# Patient Record
Sex: MALE | Race: WHITE | NOT HISPANIC OR LATINO | Employment: UNEMPLOYED | URBAN - NONMETROPOLITAN AREA
[De-identification: names, ages, dates, MRNs, and addresses within clinical notes are randomized per-mention and may not be internally consistent; named-entity substitution may affect disease eponyms.]

---

## 2022-08-15 ENCOUNTER — TELEPHONE (OUTPATIENT)
Dept: GASTROENTEROLOGY | Facility: CLINIC | Age: 22
End: 2022-08-15

## 2022-08-15 ENCOUNTER — OFFICE VISIT (OUTPATIENT)
Dept: GASTROENTEROLOGY | Facility: CLINIC | Age: 22
End: 2022-08-15

## 2022-08-15 ENCOUNTER — HOSPITAL ENCOUNTER (OUTPATIENT)
Dept: GENERAL RADIOLOGY | Facility: HOSPITAL | Age: 22
Discharge: HOME | End: 2022-08-15
Admitting: PHYSICIAN ASSISTANT

## 2022-08-15 VITALS — WEIGHT: 115 LBS | HEIGHT: 65 IN | BODY MASS INDEX: 19.16 KG/M2

## 2022-08-15 DIAGNOSIS — K59.04 CHRONIC IDIOPATHIC CONSTIPATION: Primary | ICD-10-CM

## 2022-08-15 DIAGNOSIS — K59.04 CHRONIC IDIOPATHIC CONSTIPATION: ICD-10-CM

## 2022-08-15 PROCEDURE — 99204 OFFICE O/P NEW MOD 45 MIN: CPT | Performed by: PHYSICIAN ASSISTANT

## 2022-08-15 PROCEDURE — 74018 RADEX ABDOMEN 1 VIEW: CPT | Performed by: RADIOLOGY

## 2022-08-15 PROCEDURE — 74018 RADEX ABDOMEN 1 VIEW: CPT

## 2022-08-15 RX ORDER — POLYETHYLENE GLYCOL 3350 17 G/17G
POWDER, FOR SOLUTION ORAL
COMMUNITY
Start: 2022-07-12 | End: 2022-09-19

## 2022-08-15 RX ORDER — LACTULOSE 10 G/15ML
20 SOLUTION ORAL 2 TIMES DAILY
Qty: 946 ML | Refills: 1 | Status: SHIPPED | OUTPATIENT
Start: 2022-08-15 | End: 2022-09-19

## 2022-09-19 ENCOUNTER — OFFICE VISIT (OUTPATIENT)
Dept: GASTROENTEROLOGY | Facility: CLINIC | Age: 22
End: 2022-09-19

## 2022-09-19 ENCOUNTER — TELEPHONE (OUTPATIENT)
Dept: UROLOGY | Facility: CLINIC | Age: 22
End: 2022-09-19

## 2022-09-19 VITALS — HEIGHT: 65 IN | WEIGHT: 114.2 LBS | BODY MASS INDEX: 19.03 KG/M2

## 2022-09-19 DIAGNOSIS — K59.04 CHRONIC IDIOPATHIC CONSTIPATION: ICD-10-CM

## 2022-09-19 DIAGNOSIS — K59.04 CHRONIC IDIOPATHIC CONSTIPATION: Primary | ICD-10-CM

## 2022-09-19 PROCEDURE — 99213 OFFICE O/P EST LOW 20 MIN: CPT | Performed by: PHYSICIAN ASSISTANT

## 2022-09-19 RX ORDER — POLYETHYLENE GLYCOL 3350 17 G/17G
510 POWDER, FOR SOLUTION ORAL ONCE
Qty: 510 G | Refills: 0 | Status: SHIPPED | OUTPATIENT
Start: 2022-09-19 | End: 2022-09-19

## 2022-09-20 RX ORDER — POLYETHYLENE GLYCOL 3350 17 G/17G
510 POWDER, FOR SOLUTION ORAL TAKE AS DIRECTED
Qty: 510 G | Refills: 0 | Status: SHIPPED | OUTPATIENT
Start: 2022-09-20

## 2023-10-08 VITALS
BODY MASS INDEX: 18.99 KG/M2 | HEIGHT: 65 IN | OXYGEN SATURATION: 99 % | TEMPERATURE: 98.7 F | SYSTOLIC BLOOD PRESSURE: 164 MMHG | WEIGHT: 114 LBS | HEART RATE: 65 BPM | RESPIRATION RATE: 18 BRPM | DIASTOLIC BLOOD PRESSURE: 95 MMHG

## 2023-10-08 PROCEDURE — 99282 EMERGENCY DEPT VISIT SF MDM: CPT

## 2023-10-09 ENCOUNTER — HOSPITAL ENCOUNTER (EMERGENCY)
Facility: HOSPITAL | Age: 23
Discharge: HOME | End: 2023-10-09
Attending: EMERGENCY MEDICINE | Admitting: STUDENT IN AN ORGANIZED HEALTH CARE EDUCATION/TRAINING PROGRAM
Payer: COMMERCIAL

## 2023-10-09 DIAGNOSIS — K59.00 CONSTIPATION, UNSPECIFIED CONSTIPATION TYPE: Primary | ICD-10-CM

## 2023-10-09 LAB
BILIRUB UR QL STRIP: NEGATIVE
CLARITY UR: ABNORMAL
COLOR UR: YELLOW
GLUCOSE UR STRIP-MCNC: NEGATIVE MG/DL
HGB UR QL STRIP.AUTO: NEGATIVE
KETONES UR QL STRIP: NEGATIVE
LEUKOCYTE ESTERASE UR QL STRIP.AUTO: NEGATIVE
NITRITE UR QL STRIP: NEGATIVE
PH UR STRIP.AUTO: 6 [PH] (ref 5–8)
PROT UR QL STRIP: NEGATIVE
SP GR UR STRIP: 1.02 (ref 1–1.03)
UROBILINOGEN UR QL STRIP: ABNORMAL

## 2023-10-09 PROCEDURE — 81003 URINALYSIS AUTO W/O SCOPE: CPT | Performed by: EMERGENCY MEDICINE

## 2023-10-22 ENCOUNTER — HOSPITAL ENCOUNTER (EMERGENCY)
Facility: HOSPITAL | Age: 23
Discharge: HOME | End: 2023-10-22
Attending: EMERGENCY MEDICINE | Admitting: EMERGENCY MEDICINE
Payer: COMMERCIAL

## 2023-10-22 VITALS
DIASTOLIC BLOOD PRESSURE: 80 MMHG | SYSTOLIC BLOOD PRESSURE: 133 MMHG | WEIGHT: 114 LBS | HEART RATE: 81 BPM | OXYGEN SATURATION: 98 % | RESPIRATION RATE: 20 BRPM | HEIGHT: 64 IN | TEMPERATURE: 98.5 F | BODY MASS INDEX: 19.46 KG/M2

## 2023-10-22 DIAGNOSIS — K59.09 CHRONIC CONSTIPATION: Primary | ICD-10-CM

## 2023-10-22 PROCEDURE — 99284 EMERGENCY DEPT VISIT MOD MDM: CPT

## 2023-10-22 RX ORDER — MAG HYDROX/ALUMINUM HYD/SIMETH 400-400-40
1 SUSPENSION, ORAL (FINAL DOSE FORM) ORAL ONCE
Status: COMPLETED | OUTPATIENT
Start: 2023-10-22 | End: 2023-10-22

## 2023-10-22 RX ADMIN — GLYCERIN 1 SUPPOSITORY: 2 SUPPOSITORY RECTAL at 22:49

## 2024-12-10 ENCOUNTER — TELEPHONE (OUTPATIENT)
Dept: CARDIOLOGY | Facility: CLINIC | Age: 24
End: 2024-12-10

## 2024-12-10 NOTE — TELEPHONE ENCOUNTER
Appt scheduled.  Will continue to attempt to reach the pt to notify him of sooner appt date, time, & instructions.

## 2024-12-10 NOTE — TELEPHONE ENCOUNTER
Caller: NJJEWELPACO NOT ON [facility] BUT PT GAVE PERMISSION     Relationship: Other    Best call back number: [phone]     What is the best time to reach you: ANY     What was the call regarding: ADRIANA MARK AT [facility] WAS TO CALL UP HERE AND SEE IF PT COULD GET IN QUICKER- DUE TO HIGH BP AND CHEST PAINS. HEART MONITOR SHOWED THAT PTS HEART IS OUT OF RHYTHM. PLEASE ADVISE IF PT CAN BE SEEN SOONER, OK TO SEE ANYONE.    CHEST PAINS ACTIVE NOW, COMING AND GOING. DENIED NCC.     Is it okay if the provider responds through MyChart: CALL

## 2024-12-11 ENCOUNTER — OFFICE VISIT (OUTPATIENT)
Dept: CARDIOLOGY | Facility: CLINIC | Age: 24
End: 2024-12-11
Payer: COMMERCIAL

## 2024-12-11 VITALS
SYSTOLIC BLOOD PRESSURE: 162 MMHG | HEART RATE: 70 BPM | BODY MASS INDEX: 19.43 KG/M2 | WEIGHT: 116.6 LBS | OXYGEN SATURATION: 99 % | HEIGHT: 65 IN | DIASTOLIC BLOOD PRESSURE: 92 MMHG

## 2024-12-11 DIAGNOSIS — R06.02 SHORTNESS OF BREATH: Primary | ICD-10-CM

## 2024-12-11 DIAGNOSIS — R00.2 PALPITATIONS: ICD-10-CM

## 2024-12-11 DIAGNOSIS — R07.2 PRECORDIAL PAIN: ICD-10-CM

## 2024-12-11 DIAGNOSIS — I10 PRIMARY HYPERTENSION: ICD-10-CM

## 2024-12-11 PROCEDURE — 3080F DIAST BP >= 90 MM HG: CPT | Performed by: NURSE PRACTITIONER

## 2024-12-11 PROCEDURE — 1160F RVW MEDS BY RX/DR IN RCRD: CPT | Performed by: NURSE PRACTITIONER

## 2024-12-11 PROCEDURE — 3077F SYST BP >= 140 MM HG: CPT | Performed by: NURSE PRACTITIONER

## 2024-12-11 PROCEDURE — 1159F MED LIST DOCD IN RCRD: CPT | Performed by: NURSE PRACTITIONER

## 2024-12-11 PROCEDURE — 99204 OFFICE O/P NEW MOD 45 MIN: CPT | Performed by: NURSE PRACTITIONER

## 2024-12-11 RX ORDER — LOSARTAN POTASSIUM 25 MG/1
12.5 TABLET ORAL DAILY
Qty: 15 TABLET | Refills: 6 | Status: SHIPPED | OUTPATIENT
Start: 2024-12-11

## 2024-12-11 RX ORDER — CLONIDINE HYDROCHLORIDE 0.1 MG/1
0.1 TABLET ORAL 2 TIMES DAILY PRN
COMMUNITY

## 2024-12-11 RX ORDER — BUSPIRONE HYDROCHLORIDE 10 MG/1
1 TABLET ORAL EVERY 12 HOURS SCHEDULED
COMMUNITY
Start: 2024-12-09

## 2024-12-11 NOTE — PROGRESS NOTES
Subjective     Dagoberto Henley is a 24 y.o. male who presents to day for Hypertension and Slow Heart Rate (Monitor report in media).    CHIEF COMPLIANT  Chief Complaint   Patient presents with    Hypertension    Slow Heart Rate     Monitor report in media       Active Problems:  Problem List Items Addressed This Visit          Cardiac and Vasculature    Palpitations    Relevant Orders    Adult Transthoracic Echo Complete W/ Cont if Necessary Per Protocol    Treadmill Stress Test    Primary hypertension    Relevant Medications    cloNIDine (CATAPRES) 0.1 MG tablet    losartan (Cozaar) 25 MG tablet    Other Relevant Orders    Adult Transthoracic Echo Complete W/ Cont if Necessary Per Protocol    Treadmill Stress Test       Pulmonary and Pneumonias    Shortness of breath - Primary    Relevant Orders    Adult Transthoracic Echo Complete W/ Cont if Necessary Per Protocol    Treadmill Stress Test     Other Visit Diagnoses       Precordial pain        Relevant Orders    Treadmill Stress Test        Problem list  1.  Chest pain  2.  Shortness of breath  3.  Palpitations  4.  Hypertension    HPI  HPI  Mr. Dagoberto Henley is a 24-year-old male patient who is being seen today to establish care for cardiac evaluation.    Patient does report chest pain that occurs quite frequently.  He said he had chest pain lasting from last night into today.  He says that he did play basketball the other day and it did not really change the chest pain.  He is continue to have it.  It can occur with activity and at rest.  He says it is more coming and going now.  He overall he is unable to report the exact frequency or duration of the chest pains.  He does note that it had been worse over the last couple weeks.    Patient does report shortness of breath that mainly occurs with blood pressure issues.  He also reports worsening shortness of breath when he exercises.  He also has some dyspnea at rest and orthopnea.    Patient does have intermittent  palpitations that occur in his chest.  Unable to quantify the frequency and duration of the palpitations.  He says it will go from racing to a slow heart rate.  Patient was noted to have a sinus arrhythmia on his Holter monitor.  He was bradycardic 11.31% of the time with a minimal heart rate of 44 average heart rate is 64 and a maximum heart rate of 100.  He had no PACs and a minimal PVC burden.  We did discuss this.    He also reports that he had blood work done by his PCP which is unavailable to us at this time.  He said he was told everything was okay except for his CO2 or his vitamin D.    Patient does have chronic arterial hypertension in which she is previously on what sounds to be hydrochlorothiazide.  He said it dropped his blood pressure too low.  He is now just taking clonidine when his blood pressure gets over 160/90.  He took a clonidine this morning.  On recheck prior to discharge from the office his blood pressure dropped to 131/82 heart rate is 79.  PRIOR MEDS  Current Outpatient Medications on File Prior to Visit   Medication Sig Dispense Refill    busPIRone (BUSPAR) 10 MG tablet Take 1 tablet by mouth Every 12 (Twelve) Hours.      cloNIDine (CATAPRES) 0.1 MG tablet Take 1 tablet by mouth 2 (Two) Times a Day As Needed for High Blood Pressure (if blood pressure is greater than 160/90).      polyethylene glycol (MIRALAX) 17 GM/SCOOP powder Take 510 g by mouth Take As Directed. Place 15 cap fulls of powder in 32 oz of liquid of choice at 4:00 and 10:00  g 0     No current facility-administered medications on file prior to visit.       ALLERGIES  Amoxicillin and Penicillins    HISTORY  Past Medical History:   Diagnosis Date    Anxiety and depression     Hypertension     Stomach disease        Social History     Socioeconomic History    Marital status: Unknown   Tobacco Use    Smoking status: Never    Smokeless tobacco: Never   Vaping Use    Vaping status: Never Used   Substance and Sexual Activity  "   Alcohol use: Never    Drug use: Never    Sexual activity: Defer       Family History   Problem Relation Age of Onset    Kidney failure Mother     COPD Mother     Heart failure Mother     No Known Problems Other        Review of Systems   Constitutional:  Negative for chills, fatigue and fever.   HENT:  Positive for sore throat. Negative for congestion and rhinorrhea.    Eyes:  Negative for visual disturbance (wears glasses).   Respiratory:  Positive for shortness of breath (with blood pressure issues). Negative for apnea and chest tightness.    Cardiovascular:  Positive for palpitations (races at times other times it is slow). Negative for chest pain and leg swelling.   Gastrointestinal:  Negative for constipation, diarrhea and nausea.   Musculoskeletal:  Positive for arthralgias (right wrist). Negative for back pain and neck pain.   Skin:  Negative for rash and wound.   Allergic/Immunologic: Positive for environmental allergies. Negative for food allergies.   Neurological:  Positive for dizziness (random), weakness (in am when he gets up) and light-headedness. Negative for syncope.   Hematological:  Bruises/bleeds easily (bruise).   Psychiatric/Behavioral:  Negative for sleep disturbance.        Objective     VITALS: /92 (BP Location: Left arm, Patient Position: Sitting, Cuff Size: Adult)   Pulse 70   Ht 165.1 cm (65\")   Wt 52.9 kg (116 lb 9.6 oz)   SpO2 99%   BMI 19.40 kg/m²     LABS:   Lab Results (most recent)       None            IMAGING:   No Images in the past 120 days found..    EXAM:  Physical Exam  Vitals and nursing note reviewed.   Constitutional:       Appearance: He is well-developed.   HENT:      Head: Normocephalic.   Neck:      Thyroid: No thyroid mass.      Vascular: No carotid bruit or JVD.      Trachea: Trachea and phonation normal.   Cardiovascular:      Rate and Rhythm: Normal rate. Rhythm irregular.      Pulses:           Radial pulses are 2+ on the right side and 2+ on the left " side.        Posterior tibial pulses are 2+ on the right side and 2+ on the left side.      Heart sounds: Normal heart sounds. No murmur heard.     No friction rub. No gallop.   Pulmonary:      Effort: Pulmonary effort is normal. No respiratory distress.      Breath sounds: Normal breath sounds. No wheezing or rales.   Musculoskeletal:         General: No swelling. Normal range of motion.      Cervical back: Neck supple.   Skin:     General: Skin is warm and dry.      Capillary Refill: Capillary refill takes less than 2 seconds.      Findings: No rash.   Neurological:      Mental Status: He is alert and oriented to person, place, and time.   Psychiatric:         Speech: Speech normal.         Behavior: Behavior normal.         Thought Content: Thought content normal.         Judgment: Judgment normal.         Procedure   Procedures       Assessment & Plan    Diagnosis Plan   1. Shortness of breath  Adult Transthoracic Echo Complete W/ Cont if Necessary Per Protocol    Treadmill Stress Test      2. Palpitations  Adult Transthoracic Echo Complete W/ Cont if Necessary Per Protocol    Treadmill Stress Test      3. Primary hypertension  Adult Transthoracic Echo Complete W/ Cont if Necessary Per Protocol    Treadmill Stress Test    losartan (Cozaar) 25 MG tablet      4. Precordial pain  Treadmill Stress Test      1.  Due to patient's shortness of breath, chest pain, and palpitations I would like for him to go under an ischemic evaluation including treadmill stress test and echocardiogram.    2.  Patient continues to have palpitations with sinus arrhythmia noted on his monitor minimal PAC PVC burden.  No significant arrhythmias were noted.  Will continue to follow.    3.  Had a long conversation with patient regards to his blood pressure.  Told him that by controlling his blood pressure daily medications would be much more effective than treatment once as high with the clonidine.  He is in agreements to trial losartan 12.5  mg daily.  He will monitor his blood pressure closely and report any significant highs or lows.  Today's blood pressure was elevated.    4.  Informed of signs and symptoms of ACS and advised to seek emergent treatment for any new worsening symptoms.  Patient also advised sooner follow-up as needed.  Also advised to follow-up with family doctor as needed  This note is dictated utilizing voice recognition software.  Although this record has been proof read, transcriptional errors may still be present. If questions occur regarding the content of this record please do not hesitate to call our office.  I have reviewed and confirmed the accuracy of the ROS as documented by the MA/LPN/RN SENTHIL Kirby    Return if symptoms worsen or fail to improve, for Next scheduled follow up.    Diagnoses and all orders for this visit:    1. Shortness of breath (Primary)  -     Adult Transthoracic Echo Complete W/ Cont if Necessary Per Protocol; Future  -     Treadmill Stress Test; Future    2. Palpitations  -     Adult Transthoracic Echo Complete W/ Cont if Necessary Per Protocol; Future  -     Treadmill Stress Test; Future    3. Primary hypertension  -     Adult Transthoracic Echo Complete W/ Cont if Necessary Per Protocol; Future  -     Treadmill Stress Test; Future  -     losartan (Cozaar) 25 MG tablet; Take 0.5 tablets by mouth Daily.  Dispense: 15 tablet; Refill: 6    4. Precordial pain  -     Treadmill Stress Test; Future        Dagoberto Henley  reports that he has never smoked. He has never used smokeless tobacco. I have educated him on the risk of diseases from using tobacco products. Patient does not smoke.        BMI is within normal parameters. No other follow-up for BMI required.           MEDS ORDERED DURING VISIT:  New Medications Ordered This Visit   Medications    losartan (Cozaar) 25 MG tablet     Sig: Take 0.5 tablets by mouth Daily.     Dispense:  15 tablet     Refill:  6           This document has been  electronically signed by Kingston James Jr., APRN  December 11, 2024 11:44 EST

## 2025-01-09 ENCOUNTER — OFFICE VISIT (OUTPATIENT)
Dept: GASTROENTEROLOGY | Facility: CLINIC | Age: 25
End: 2025-01-09
Payer: COMMERCIAL

## 2025-01-09 VITALS
WEIGHT: 115.4 LBS | HEIGHT: 65 IN | BODY MASS INDEX: 19.22 KG/M2 | SYSTOLIC BLOOD PRESSURE: 145 MMHG | DIASTOLIC BLOOD PRESSURE: 80 MMHG | HEART RATE: 55 BPM

## 2025-01-09 DIAGNOSIS — K58.1 IRRITABLE BOWEL SYNDROME WITH CONSTIPATION: Primary | ICD-10-CM

## 2025-01-09 NOTE — PROGRESS NOTES
"Date of Consultation:  1/9/2025  Referring Physician: PAPO Stanton*    Chief Complaint  Constipation    Dagoberto Henley is a 24 y.o. male who presents today to [facility] for Constipation.    HPI:   24-year-old male who presents today for evaluation of constipation.  Patient states that on average he has 2-3 bowel movements per week that he rates as BSS 1, 3, or 7.  States that he takes MiraLAX \"whenever I start cramping.\"  He notes abdominal cramping before and after having a bowel movement.  States that this is slightly relieved with complete evacuation of his colon.  He is concerned that he has diarrhea that is much worse if he drinks Gatorade or plays basketball.  States that while he is playing basketball he often has to leave in order to have a bowel movement.  He denies unintentional weight loss, nausea, vomiting, abdominal pain, melena, or hematochezia.           Previous History:   Past Medical History:   Diagnosis Date    Anxiety and depression     Hypertension     Stomach disease       Past Surgical History:   Procedure Laterality Date    MOUTH SURGERY      OTHER SURGICAL HISTORY      had dental procedure      Social History     Socioeconomic History    Marital status: Unknown   Tobacco Use    Smoking status: Never    Smokeless tobacco: Never   Vaping Use    Vaping status: Never Used   Substance and Sexual Activity    Alcohol use: Never    Drug use: Never    Sexual activity: Defer        Current Medications:  Current Outpatient Medications   Medication Sig Dispense Refill    busPIRone (BUSPAR) 10 MG tablet Take 1 tablet by mouth Every 12 (Twelve) Hours.      cloNIDine (CATAPRES) 0.1 MG tablet Take 1 tablet by mouth 2 (Two) Times a Day As Needed for High Blood Pressure (if blood pressure is greater than 160/90).      losartan (Cozaar) 25 MG tablet Take 0.5 tablets by mouth Daily. 15 tablet 6    linaclotide (Linzess) 72 MCG capsule capsule Take 1 capsule " "by mouth Every Morning Before Breakfast. Wait 30 mins before eating. 60 capsule 2     No current facility-administered medications for this visit.       Allergies:   Allergies   Allergen Reactions    Amoxicillin Hives    Penicillins Hives       Vitals:   /80   Pulse 55   Ht 165.1 cm (65\")   Wt 52.3 kg (115 lb 6.4 oz)   BMI 19.20 kg/m²   Estimated body mass index is 19.2 kg/m² as calculated from the following:    Height as of this encounter: 165.1 cm (65\").    Weight as of this encounter: 52.3 kg (115 lb 6.4 oz).    Dagoberto Henley  reports that he has never smoked. He has never used smokeless tobacco. I have educated him on the risk of diseases from using tobacco products such as cancer, COPD, and heart disease.     ROS:   Review of Systems   Constitutional: Negative.    HENT: Negative.     Respiratory: Negative.     Cardiovascular: Negative.    Gastrointestinal:  Positive for abdominal pain, constipation and diarrhea. Negative for abdominal distention, anal bleeding, blood in stool, nausea, rectal pain and vomiting.   Musculoskeletal: Negative.    All other systems reviewed and are negative.       Physical Exam:   Physical Exam  Vitals reviewed.   Constitutional:       General: He is not in acute distress.     Appearance: Normal appearance. He is well-groomed. He is not toxic-appearing.   HENT:      Head: Normocephalic and atraumatic.      Mouth/Throat:      Mouth: Mucous membranes are moist.   Eyes:      Extraocular Movements: Extraocular movements intact.   Cardiovascular:      Rate and Rhythm: Normal rate and regular rhythm.      Heart sounds: Normal heart sounds. No murmur heard.  Pulmonary:      Effort: Pulmonary effort is normal. No respiratory distress.      Breath sounds: Normal breath sounds. No stridor. No wheezing or rales.   Abdominal:      General: Bowel sounds are normal. There is no distension.      Palpations: Abdomen is soft. There is no mass.      Tenderness: There is no abdominal " "tenderness. There is no guarding or rebound.      Hernia: No hernia is present.   Skin:     General: Skin is warm.      Coloration: Skin is not jaundiced.      Findings: No rash.   Neurological:      Mental Status: He is alert and oriented to person, place, and time.   Psychiatric:         Mood and Affect: Mood and affect normal.         Speech: Speech normal.         Behavior: Behavior normal. Behavior is cooperative.         Thought Content: Thought content normal.          Lab Results:   No results found for: \"WBC\", \"HGB\", \"HCT\", \"MCV\", \"RDW\", \"PLT\", \"NEUTRORELPCT\", \"LYMPHORELPCT\", \"MONORELPCT\", \"EOSRELPCT\", \"BASORELPCT\", \"NEUTROABS\", \"LYMPHSABS\"    No results found for: \"NA\", \"K\", \"CO2\", \"CL\", \"BUN\", \"CREATININE\", \"EGFRIFNONA\", \"EGFRIFAFRI\", \"GLUCOSE\", \"CALCIUM\", \"ALKPHOS\", \"AST\", \"ALT\", \"BILITOT\", \"ALBUMIN\", \"PROTEINTOT\", \"MG\", \"PHOS\"    Pathology:        Endoscopy:        Imaging:   No Images in the past 120 days found..        Results review: During today's encounter, all relevant clinical data has been reviewed.      Assessment and Plan    1. Irritable bowel syndrome with constipation (Primary)  We will trial Linzess 72 mcg once daily.  Samples provided in office.  Rx sent.  -     linaclotide (Linzess) 72 MCG capsule capsule; Take 1 capsule by mouth Every Morning Before Breakfast. Wait 30 mins before eating.  Dispense: 60 capsule; Refill: 2      New Medications:   New Medications Ordered This Visit   Medications    linaclotide (Linzess) 72 MCG capsule capsule     Sig: Take 1 capsule by mouth Every Morning Before Breakfast. Wait 30 mins before eating.     Dispense:  60 capsule     Refill:  2       Discontinued Medications:   Medications Discontinued During This Encounter   Medication Reason    polyethylene glycol (MIRALAX) 17 GM/SCOOP powder *Therapy completed        Visit Diagnoses:    ICD-10-CM ICD-9-CM   1. Irritable bowel syndrome with constipation  K58.1 564.1            Follow Up:   Return in about 8 " weeks (around 3/6/2025).    The patient was in agreement with the plan and all questions were answered to patient's satisfaction.        This document has been electronically signed by Janell Bales PA-C   January 9, 2025 15:57 EST    Dictated Utilizing Dragon Dictation: Part of this note may be an electronic transcription/translation of spoken language to printed text using the Dragon Dictation System.    CC:  PAPO Stanton*  Jaylin Mcmahon, APRN

## 2025-01-12 ENCOUNTER — APPOINTMENT (OUTPATIENT)
Dept: GENERAL RADIOLOGY | Facility: HOSPITAL | Age: 25
End: 2025-01-12
Payer: COMMERCIAL

## 2025-01-12 ENCOUNTER — HOSPITAL ENCOUNTER (EMERGENCY)
Facility: HOSPITAL | Age: 25
Discharge: HOME | End: 2025-01-12
Attending: EMERGENCY MEDICINE
Payer: COMMERCIAL

## 2025-01-12 VITALS
OXYGEN SATURATION: 96 % | HEART RATE: 64 BPM | RESPIRATION RATE: 14 BRPM | WEIGHT: 115 LBS | HEIGHT: 65 IN | SYSTOLIC BLOOD PRESSURE: 150 MMHG | TEMPERATURE: 98.2 F | BODY MASS INDEX: 19.16 KG/M2 | DIASTOLIC BLOOD PRESSURE: 89 MMHG

## 2025-01-12 DIAGNOSIS — R07.9 NONSPECIFIC CHEST PAIN: Primary | ICD-10-CM

## 2025-01-12 LAB
ALBUMIN SERPL-MCNC: 4.7 G/DL (ref 3.5–5.2)
ALBUMIN/GLOB SERPL: 1.6 G/DL
ALP SERPL-CCNC: 83 U/L (ref 39–117)
ALT SERPL W P-5'-P-CCNC: 12 U/L (ref 1–41)
ANION GAP SERPL CALCULATED.3IONS-SCNC: 11.3 MMOL/L (ref 5–15)
AST SERPL-CCNC: 19 U/L (ref 1–40)
BASOPHILS # BLD AUTO: 0.09 10*3/MM3 (ref 0–0.2)
BASOPHILS NFR BLD AUTO: 1.1 % (ref 0–1.5)
BILIRUB SERPL-MCNC: 0.8 MG/DL (ref 0–1.2)
BUN SERPL-MCNC: 13 MG/DL (ref 6–20)
BUN/CREAT SERPL: 12.6 (ref 7–25)
CALCIUM SPEC-SCNC: 9.9 MG/DL (ref 8.6–10.5)
CHLORIDE SERPL-SCNC: 104 MMOL/L (ref 98–107)
CO2 SERPL-SCNC: 25.7 MMOL/L (ref 22–29)
CREAT SERPL-MCNC: 1.03 MG/DL (ref 0.76–1.27)
DEPRECATED RDW RBC AUTO: 39.1 FL (ref 37–54)
EGFRCR SERPLBLD CKD-EPI 2021: 105.3 ML/MIN/1.73
EOSINOPHIL # BLD AUTO: 0.15 10*3/MM3 (ref 0–0.4)
EOSINOPHIL NFR BLD AUTO: 1.8 % (ref 0.3–6.2)
ERYTHROCYTE [DISTWIDTH] IN BLOOD BY AUTOMATED COUNT: 12.5 % (ref 12.3–15.4)
GEN 5 1HR TROPONIN T REFLEX: <6 NG/L
GLOBULIN UR ELPH-MCNC: 2.9 GM/DL
GLUCOSE SERPL-MCNC: 97 MG/DL (ref 65–99)
HCT VFR BLD AUTO: 46.1 % (ref 37.5–51)
HGB BLD-MCNC: 15.6 G/DL (ref 13–17.7)
HOLD SPECIMEN: NORMAL
HOLD SPECIMEN: NORMAL
IMM GRANULOCYTES # BLD AUTO: 0.03 10*3/MM3 (ref 0–0.05)
IMM GRANULOCYTES NFR BLD AUTO: 0.4 % (ref 0–0.5)
LYMPHOCYTES # BLD AUTO: 2.08 10*3/MM3 (ref 0.7–3.1)
LYMPHOCYTES NFR BLD AUTO: 24.6 % (ref 19.6–45.3)
MCH RBC QN AUTO: 29.2 PG (ref 26.6–33)
MCHC RBC AUTO-ENTMCNC: 33.8 G/DL (ref 31.5–35.7)
MCV RBC AUTO: 86.2 FL (ref 79–97)
MONOCYTES # BLD AUTO: 0.53 10*3/MM3 (ref 0.1–0.9)
MONOCYTES NFR BLD AUTO: 6.3 % (ref 5–12)
NEUTROPHILS NFR BLD AUTO: 5.59 10*3/MM3 (ref 1.7–7)
NEUTROPHILS NFR BLD AUTO: 65.8 % (ref 42.7–76)
NRBC BLD AUTO-RTO: 0 /100 WBC (ref 0–0.2)
PLATELET # BLD AUTO: 275 10*3/MM3 (ref 140–450)
PMV BLD AUTO: 9.7 FL (ref 6–12)
POTASSIUM SERPL-SCNC: 3.7 MMOL/L (ref 3.5–5.2)
PROT SERPL-MCNC: 7.6 G/DL (ref 6–8.5)
RBC # BLD AUTO: 5.35 10*6/MM3 (ref 4.14–5.8)
SODIUM SERPL-SCNC: 141 MMOL/L (ref 136–145)
TROPONIN T NUMERIC DELTA: NORMAL
TROPONIN T SERPL HS-MCNC: <6 NG/L
WBC NRBC COR # BLD AUTO: 8.47 10*3/MM3 (ref 3.4–10.8)
WHOLE BLOOD HOLD COAG: NORMAL
WHOLE BLOOD HOLD SPECIMEN: NORMAL

## 2025-01-12 PROCEDURE — 80053 COMPREHEN METABOLIC PANEL: CPT | Performed by: EMERGENCY MEDICINE

## 2025-01-12 PROCEDURE — 93010 ELECTROCARDIOGRAM REPORT: CPT | Performed by: STUDENT IN AN ORGANIZED HEALTH CARE EDUCATION/TRAINING PROGRAM

## 2025-01-12 PROCEDURE — 85025 COMPLETE CBC W/AUTO DIFF WBC: CPT | Performed by: EMERGENCY MEDICINE

## 2025-01-12 PROCEDURE — 71045 X-RAY EXAM CHEST 1 VIEW: CPT

## 2025-01-12 PROCEDURE — 93005 ELECTROCARDIOGRAM TRACING: CPT | Performed by: EMERGENCY MEDICINE

## 2025-01-12 PROCEDURE — 99284 EMERGENCY DEPT VISIT MOD MDM: CPT

## 2025-01-12 PROCEDURE — 71045 X-RAY EXAM CHEST 1 VIEW: CPT | Performed by: RADIOLOGY

## 2025-01-12 PROCEDURE — 36415 COLL VENOUS BLD VENIPUNCTURE: CPT

## 2025-01-12 PROCEDURE — 84484 ASSAY OF TROPONIN QUANT: CPT | Performed by: EMERGENCY MEDICINE

## 2025-01-12 RX ORDER — ASPIRIN 81 MG/1
324 TABLET, CHEWABLE ORAL ONCE
Status: COMPLETED | OUTPATIENT
Start: 2025-01-12 | End: 2025-01-12

## 2025-01-12 RX ORDER — SODIUM CHLORIDE 0.9 % (FLUSH) 0.9 %
10 SYRINGE (ML) INJECTION AS NEEDED
Status: DISCONTINUED | OUTPATIENT
Start: 2025-01-12 | End: 2025-01-12 | Stop reason: HOSPADM

## 2025-01-12 RX ADMIN — ASPIRIN 324 MG: 81 TABLET, CHEWABLE ORAL at 20:46

## 2025-01-13 NOTE — ED PROVIDER NOTES
Subjective   History of Present Illness  24-year-old male complains of chest pain on and off in the sternal area.  No radiation of the pain.  Apparently has had this pain before.  He has a cardiologist that he sees    No nausea vomiting no sweating.        Review of Systems   Constitutional:  Negative for activity change.   HENT:  Negative for dental problem and sneezing.    Eyes:  Negative for discharge and redness.   Respiratory:  Negative for chest tightness and shortness of breath.    Cardiovascular:  Positive for chest pain. Negative for palpitations.   Gastrointestinal:  Negative for abdominal distention and abdominal pain.   Genitourinary:  Negative for dysuria.   Musculoskeletal:  Negative for arthralgias.   Skin:  Negative for color change.   Neurological:  Negative for facial asymmetry.   Psychiatric/Behavioral:  Negative for agitation.        Past Medical History:   Diagnosis Date    Anxiety and depression     Hypertension     Stomach disease        Allergies   Allergen Reactions    Amoxicillin Hives    Penicillins Hives       Past Surgical History:   Procedure Laterality Date    MOUTH SURGERY      OTHER SURGICAL HISTORY      had dental procedure       Family History   Problem Relation Age of Onset    Kidney failure Mother     COPD Mother     Heart failure Mother     No Known Problems Other        Social History     Socioeconomic History    Marital status: Unknown   Tobacco Use    Smoking status: Never    Smokeless tobacco: Never   Vaping Use    Vaping status: Never Used   Substance and Sexual Activity    Alcohol use: Never    Drug use: Never    Sexual activity: Defer           Objective   Physical Exam  HENT:      Head: Normocephalic.      Right Ear: External ear normal.      Left Ear: External ear normal.      Nose: Nose normal.      Mouth/Throat:      Mouth: Mucous membranes are moist.   Eyes:      Pupils: Pupils are equal, round, and reactive to light.   Cardiovascular:      Rate and Rhythm: Normal  rate and regular rhythm.      Heart sounds: Normal heart sounds.   Pulmonary:      Effort: Pulmonary effort is normal.      Breath sounds: Normal breath sounds.   Abdominal:      General: Abdomen is flat.      Palpations: Abdomen is soft.   Musculoskeletal:         General: Normal range of motion.      Cervical back: Normal range of motion.   Skin:     General: Skin is warm.      Capillary Refill: Capillary refill takes less than 2 seconds.   Neurological:      General: No focal deficit present.      Mental Status: He is alert.         Procedures           ED Course  ED Course as of 01/12/25 2055   Sun Jan 12, 2025 2024 Lab work reviewed.  Chemistry and CBC is normal first troponin is negative.  EKG no acute disease.  Chest x-ray is clear. [GP]      ED Course User Index  [GP] Rodney Dominguez MD                                                       Medical Decision Making  24-year-old male with history of chest pain comes in with chest pain tonight.  His workup is negative.    Will get him home to follow-up with his cardiologist.    Amount and/or Complexity of Data Reviewed  Labs: ordered.  Radiology: ordered.  ECG/medicine tests: ordered.    Risk  OTC drugs.  Prescription drug management.        Final diagnoses:   None       ED Disposition  ED Disposition       None            No follow-up provider specified.       Medication List      No changes were made to your prescriptions during this visit.

## 2025-01-14 LAB
QT INTERVAL: 336 MS
QTC INTERVAL: 394 MS

## 2025-01-23 ENCOUNTER — TELEPHONE (OUTPATIENT)
Dept: CARDIOLOGY | Facility: CLINIC | Age: 25
End: 2025-01-23
Payer: COMMERCIAL

## 2025-01-23 NOTE — TELEPHONE ENCOUNTER
Caller: CHARBEL YU    Relationship: Mother    Best call back number: [phone]    What is the best time to reach you: ANY    Who are you requesting to speak with (clinical staff, provider,  specific staff member): CLINICAL       What was the call regarding: PATIENT WAS CALLED NO NOTE IN CHART PLEASE ADVISE.     Is it okay if the provider responds through MyChart: NO

## 2025-02-07 ENCOUNTER — APPOINTMENT (OUTPATIENT)
Dept: CT IMAGING | Facility: HOSPITAL | Age: 25
End: 2025-02-07
Payer: COMMERCIAL

## 2025-02-07 ENCOUNTER — HOSPITAL ENCOUNTER (EMERGENCY)
Facility: HOSPITAL | Age: 25
Discharge: HOME | End: 2025-02-07
Attending: STUDENT IN AN ORGANIZED HEALTH CARE EDUCATION/TRAINING PROGRAM
Payer: COMMERCIAL

## 2025-02-07 VITALS
RESPIRATION RATE: 16 BRPM | WEIGHT: 117 LBS | DIASTOLIC BLOOD PRESSURE: 85 MMHG | HEIGHT: 65 IN | OXYGEN SATURATION: 98 % | BODY MASS INDEX: 19.49 KG/M2 | TEMPERATURE: 98.2 F | HEART RATE: 66 BPM | SYSTOLIC BLOOD PRESSURE: 135 MMHG

## 2025-02-07 DIAGNOSIS — K59.00 CONSTIPATION, UNSPECIFIED CONSTIPATION TYPE: Primary | ICD-10-CM

## 2025-02-07 LAB
ALBUMIN SERPL-MCNC: 4.6 G/DL (ref 3.5–5.2)
ALBUMIN/GLOB SERPL: 1.4 G/DL
ALP SERPL-CCNC: 77 U/L (ref 39–117)
ALT SERPL W P-5'-P-CCNC: 10 U/L (ref 1–41)
ANION GAP SERPL CALCULATED.3IONS-SCNC: 10.4 MMOL/L (ref 5–15)
AST SERPL-CCNC: 17 U/L (ref 1–40)
BASOPHILS # BLD AUTO: 0.06 10*3/MM3 (ref 0–0.2)
BASOPHILS NFR BLD AUTO: 0.8 % (ref 0–1.5)
BILIRUB SERPL-MCNC: 0.7 MG/DL (ref 0–1.2)
BILIRUB UR QL STRIP: NEGATIVE
BUN SERPL-MCNC: 12 MG/DL (ref 6–20)
BUN/CREAT SERPL: 12.6 (ref 7–25)
CALCIUM SPEC-SCNC: 9.5 MG/DL (ref 8.6–10.5)
CHLORIDE SERPL-SCNC: 105 MMOL/L (ref 98–107)
CLARITY UR: CLEAR
CO2 SERPL-SCNC: 27.6 MMOL/L (ref 22–29)
COLOR UR: YELLOW
CREAT SERPL-MCNC: 0.95 MG/DL (ref 0.76–1.27)
CRP SERPL-MCNC: <0.3 MG/DL (ref 0–0.5)
DEPRECATED RDW RBC AUTO: 39 FL (ref 37–54)
EGFRCR SERPLBLD CKD-EPI 2021: 116.1 ML/MIN/1.73
EOSINOPHIL # BLD AUTO: 0.25 10*3/MM3 (ref 0–0.4)
EOSINOPHIL NFR BLD AUTO: 3.4 % (ref 0.3–6.2)
ERYTHROCYTE [DISTWIDTH] IN BLOOD BY AUTOMATED COUNT: 12.4 % (ref 12.3–15.4)
GLOBULIN UR ELPH-MCNC: 3.2 GM/DL
GLUCOSE SERPL-MCNC: 104 MG/DL (ref 65–99)
GLUCOSE UR STRIP-MCNC: NEGATIVE MG/DL
HCT VFR BLD AUTO: 45.5 % (ref 37.5–51)
HGB BLD-MCNC: 15.4 G/DL (ref 13–17.7)
HGB UR QL STRIP.AUTO: NEGATIVE
HOLD SPECIMEN: NORMAL
HOLD SPECIMEN: NORMAL
IMM GRANULOCYTES # BLD AUTO: 0.01 10*3/MM3 (ref 0–0.05)
IMM GRANULOCYTES NFR BLD AUTO: 0.1 % (ref 0–0.5)
KETONES UR QL STRIP: NEGATIVE
LEUKOCYTE ESTERASE UR QL STRIP.AUTO: NEGATIVE
LYMPHOCYTES # BLD AUTO: 1.68 10*3/MM3 (ref 0.7–3.1)
LYMPHOCYTES NFR BLD AUTO: 22.5 % (ref 19.6–45.3)
MCH RBC QN AUTO: 29.2 PG (ref 26.6–33)
MCHC RBC AUTO-ENTMCNC: 33.8 G/DL (ref 31.5–35.7)
MCV RBC AUTO: 86.3 FL (ref 79–97)
MONOCYTES # BLD AUTO: 0.49 10*3/MM3 (ref 0.1–0.9)
MONOCYTES NFR BLD AUTO: 6.6 % (ref 5–12)
NEUTROPHILS NFR BLD AUTO: 4.97 10*3/MM3 (ref 1.7–7)
NEUTROPHILS NFR BLD AUTO: 66.6 % (ref 42.7–76)
NITRITE UR QL STRIP: NEGATIVE
NRBC BLD AUTO-RTO: 0 /100 WBC (ref 0–0.2)
PH UR STRIP.AUTO: 6 [PH] (ref 5–8)
PLATELET # BLD AUTO: 233 10*3/MM3 (ref 140–450)
PMV BLD AUTO: 9.6 FL (ref 6–12)
POTASSIUM SERPL-SCNC: 3.9 MMOL/L (ref 3.5–5.2)
PROT SERPL-MCNC: 7.8 G/DL (ref 6–8.5)
PROT UR QL STRIP: NEGATIVE
RBC # BLD AUTO: 5.27 10*6/MM3 (ref 4.14–5.8)
SODIUM SERPL-SCNC: 143 MMOL/L (ref 136–145)
SP GR UR STRIP: 1.02 (ref 1–1.03)
UROBILINOGEN UR QL STRIP: ABNORMAL
WBC NRBC COR # BLD AUTO: 7.46 10*3/MM3 (ref 3.4–10.8)
WHOLE BLOOD HOLD COAG: NORMAL
WHOLE BLOOD HOLD SPECIMEN: NORMAL

## 2025-02-07 PROCEDURE — 74176 CT ABD & PELVIS W/O CONTRAST: CPT | Performed by: RADIOLOGY

## 2025-02-07 PROCEDURE — 81003 URINALYSIS AUTO W/O SCOPE: CPT

## 2025-02-07 PROCEDURE — 86140 C-REACTIVE PROTEIN: CPT

## 2025-02-07 PROCEDURE — 99284 EMERGENCY DEPT VISIT MOD MDM: CPT

## 2025-02-07 PROCEDURE — 36415 COLL VENOUS BLD VENIPUNCTURE: CPT

## 2025-02-07 PROCEDURE — 74176 CT ABD & PELVIS W/O CONTRAST: CPT

## 2025-02-07 PROCEDURE — 80053 COMPREHEN METABOLIC PANEL: CPT

## 2025-02-07 PROCEDURE — 85025 COMPLETE CBC W/AUTO DIFF WBC: CPT

## 2025-02-07 RX ORDER — MAGNESIUM CARB/ALUMINUM HYDROX 105-160MG
296 TABLET,CHEWABLE ORAL ONCE
Status: COMPLETED | OUTPATIENT
Start: 2025-02-07 | End: 2025-02-07

## 2025-02-07 RX ADMIN — CITROMA MAGNESIUM CITRATE 296 ML: 1.75 LIQUID ORAL at 21:31

## 2025-02-08 NOTE — ED NOTES
Pt had small amount of stool in the bedside commode. Per provider order to try enema again, pt refused to let RN do another soap suds enema. He stated that he wants to go home and eat and that he can take the miralax and mag citrate. Provider made aware at this time ER tech at bedside to witness

## 2025-02-08 NOTE — ED NOTES
Pt states he is having some relief at this time and is having a bowel movement. Pt is currently on the bedside commode and tolerated the soap suds enema well. Provider made aware

## 2025-02-11 NOTE — ED PROVIDER NOTES
Subjective     History provided by:  Patient  Abdominal Pain  Pain location:  Generalized  Pain quality: aching    Pain radiates to:  Does not radiate  Pain severity:  Moderate  Chronicity:  New  Context: not alcohol use, not awakening from sleep, not diet changes, not eating, not laxative use, not medication withdrawal, not previous surgeries, not recent illness, not recent sexual activity, not recent travel, not retching, not sick contacts, not suspicious food intake and not trauma    Relieved by:  None tried  Worsened by:  Nothing  Ineffective treatments:  None tried  Associated symptoms: constipation and nausea    Associated symptoms: no anorexia, no belching, no chest pain, no chills, no cough, no diarrhea, no dysuria, no fatigue, no fever, no flatus, no hematemesis, no hematochezia, no hematuria, no melena, no shortness of breath, no sore throat, no vaginal bleeding, no vaginal discharge and no vomiting        Review of Systems   Constitutional: Negative.  Negative for chills, fatigue and fever.   HENT: Negative.  Negative for sore throat.    Respiratory: Negative.  Negative for cough and shortness of breath.    Cardiovascular: Negative.  Negative for chest pain.   Gastrointestinal:  Positive for abdominal pain, constipation and nausea. Negative for anorexia, diarrhea, flatus, hematemesis, hematochezia, melena and vomiting.   Endocrine: Negative.    Genitourinary: Negative.  Negative for dysuria, hematuria, vaginal bleeding and vaginal discharge.   Skin: Negative.    Neurological: Negative.    Psychiatric/Behavioral: Negative.     All other systems reviewed and are negative.      Past Medical History:   Diagnosis Date    Anxiety and depression     Hypertension     Stomach disease        Allergies   Allergen Reactions    Amoxicillin Hives    Benadryl [Diphenhydramine] Hives    Penicillins Hives       Past Surgical History:   Procedure Laterality Date    MOUTH SURGERY      OTHER SURGICAL HISTORY      had dental  procedure       Family History   Problem Relation Age of Onset    Kidney failure Mother     COPD Mother     Heart failure Mother     No Known Problems Other        Social History     Socioeconomic History    Marital status: Unknown   Tobacco Use    Smoking status: Never    Smokeless tobacco: Never   Vaping Use    Vaping status: Never Used   Substance and Sexual Activity    Alcohol use: Never    Drug use: Never    Sexual activity: Defer           Objective   Physical Exam  Vitals and nursing note reviewed.   Constitutional:       General: He is not in acute distress.     Appearance: He is well-developed. He is not diaphoretic.   HENT:      Head: Normocephalic and atraumatic.      Right Ear: External ear normal.      Left Ear: External ear normal.      Nose: Nose normal.   Eyes:      Conjunctiva/sclera: Conjunctivae normal.      Pupils: Pupils are equal, round, and reactive to light.   Neck:      Vascular: No JVD.      Trachea: No tracheal deviation.   Cardiovascular:      Rate and Rhythm: Normal rate and regular rhythm.      Heart sounds: Normal heart sounds. No murmur heard.  Pulmonary:      Effort: Pulmonary effort is normal. No respiratory distress.      Breath sounds: Normal breath sounds. No wheezing.   Abdominal:      General: Bowel sounds are normal.      Palpations: Abdomen is soft.      Tenderness: There is no abdominal tenderness.   Musculoskeletal:         General: No deformity. Normal range of motion.      Cervical back: Normal range of motion and neck supple.   Skin:     General: Skin is warm and dry.      Coloration: Skin is not pale.      Findings: No erythema or rash.   Neurological:      Mental Status: He is alert and oriented to person, place, and time.      Cranial Nerves: No cranial nerve deficit.   Psychiatric:         Behavior: Behavior normal.         Thought Content: Thought content normal.         Procedures       Results for orders placed or performed during the hospital encounter of  02/07/25   Comprehensive Metabolic Panel    Collection Time: 02/07/25  3:54 PM    Specimen: Blood   Result Value Ref Range    Glucose 104 (H) 65 - 99 mg/dL    BUN 12 6 - 20 mg/dL    Creatinine 0.95 0.76 - 1.27 mg/dL    Sodium 143 136 - 145 mmol/L    Potassium 3.9 3.5 - 5.2 mmol/L    Chloride 105 98 - 107 mmol/L    CO2 27.6 22.0 - 29.0 mmol/L    Calcium 9.5 8.6 - 10.5 mg/dL    Total Protein 7.8 6.0 - 8.5 g/dL    Albumin 4.6 3.5 - 5.2 g/dL    ALT (SGPT) 10 1 - 41 U/L    AST (SGOT) 17 1 - 40 U/L    Alkaline Phosphatase 77 39 - 117 U/L    Total Bilirubin 0.7 0.0 - 1.2 mg/dL    Globulin 3.2 gm/dL    A/G Ratio 1.4 g/dL    BUN/Creatinine Ratio 12.6 7.0 - 25.0    Anion Gap 10.4 5.0 - 15.0 mmol/L    eGFR 116.1 >60.0 mL/min/1.73   C-reactive Protein    Collection Time: 02/07/25  3:54 PM    Specimen: Blood   Result Value Ref Range    C-Reactive Protein <0.30 0.00 - 0.50 mg/dL   CBC Auto Differential    Collection Time: 02/07/25  3:54 PM    Specimen: Blood   Result Value Ref Range    WBC 7.46 3.40 - 10.80 10*3/mm3    RBC 5.27 4.14 - 5.80 10*6/mm3    Hemoglobin 15.4 13.0 - 17.7 g/dL    Hematocrit 45.5 37.5 - 51.0 %    MCV 86.3 79.0 - 97.0 fL    MCH 29.2 26.6 - 33.0 pg    MCHC 33.8 31.5 - 35.7 g/dL    RDW 12.4 12.3 - 15.4 %    RDW-SD 39.0 37.0 - 54.0 fl    MPV 9.6 6.0 - 12.0 fL    Platelets 233 140 - 450 10*3/mm3    Neutrophil % 66.6 42.7 - 76.0 %    Lymphocyte % 22.5 19.6 - 45.3 %    Monocyte % 6.6 5.0 - 12.0 %    Eosinophil % 3.4 0.3 - 6.2 %    Basophil % 0.8 0.0 - 1.5 %    Immature Grans % 0.1 0.0 - 0.5 %    Neutrophils, Absolute 4.97 1.70 - 7.00 10*3/mm3    Lymphocytes, Absolute 1.68 0.70 - 3.10 10*3/mm3    Monocytes, Absolute 0.49 0.10 - 0.90 10*3/mm3    Eosinophils, Absolute 0.25 0.00 - 0.40 10*3/mm3    Basophils, Absolute 0.06 0.00 - 0.20 10*3/mm3    Immature Grans, Absolute 0.01 0.00 - 0.05 10*3/mm3    nRBC 0.0 0.0 - 0.2 /100 WBC   Green Top (Gel)    Collection Time: 02/07/25  3:54 PM   Result Value Ref Range    Extra  Tube Hold for add-ons.    Lavender Top    Collection Time: 02/07/25  3:54 PM   Result Value Ref Range    Extra Tube hold for add-on    Gold Top - SST    Collection Time: 02/07/25  3:54 PM   Result Value Ref Range    Extra Tube Hold for add-ons.    Light Blue Top    Collection Time: 02/07/25  3:54 PM   Result Value Ref Range    Extra Tube Hold for add-ons.    Urinalysis With Culture If Indicated - Urine, Clean Catch    Collection Time: 02/07/25  4:02 PM    Specimen: Urine, Clean Catch   Result Value Ref Range    Color, UA Yellow Yellow, Straw    Appearance, UA Clear Clear    pH, UA 6.0 5.0 - 8.0    Specific Gravity, UA 1.023 1.005 - 1.030    Glucose, UA Negative Negative    Ketones, UA Negative Negative    Bilirubin, UA Negative Negative    Blood, UA Negative Negative    Protein, UA Negative Negative    Leuk Esterase, UA Negative Negative    Nitrite, UA Negative Negative    Urobilinogen, UA 2.0 E.U./dL (A) 0.2 - 1.0 E.U./dL      CT Abdomen Pelvis Stone Protocol   Final Result   Significant rectal fecal impaction. Rectal wall thickening.       This report was finalized on 2/7/2025 6:13 PM by Alex Pallas, DO.               ED Course                                                       Medical Decision Making  Problems Addressed:  Constipation, unspecified constipation type: complicated acute illness or injury    Amount and/or Complexity of Data Reviewed  Labs: ordered.  Radiology: ordered.    Risk  OTC drugs.        Final diagnoses:   Constipation, unspecified constipation type       ED Disposition  ED Disposition       ED Disposition   Discharge    Condition   Stable    Comment   --               Jaylin Mcmahon, APRN  [address] [facility] [address]  [phone]    Call in 2 days           Medication List      No changes were made to your prescriptions during this visit.            Geri Lee, SENTHIL  02/11/25 0618

## 2025-02-13 ENCOUNTER — TELEPHONE (OUTPATIENT)
Dept: CARDIOLOGY | Facility: CLINIC | Age: 25
End: 2025-02-13
Payer: COMMERCIAL

## 2025-02-13 NOTE — TELEPHONE ENCOUNTER
I called patient and let him know that JR reviewed blood pressure log and does not wish to make any changes in medication at this time.

## 2025-02-18 ENCOUNTER — HOSPITAL ENCOUNTER (OUTPATIENT)
Dept: CARDIOLOGY | Facility: HOSPITAL | Age: 25
Discharge: HOME | End: 2025-02-18
Payer: COMMERCIAL

## 2025-02-18 DIAGNOSIS — R07.2 PRECORDIAL PAIN: ICD-10-CM

## 2025-02-18 DIAGNOSIS — R00.2 PALPITATIONS: ICD-10-CM

## 2025-02-18 DIAGNOSIS — I10 PRIMARY HYPERTENSION: ICD-10-CM

## 2025-02-18 DIAGNOSIS — R06.02 SHORTNESS OF BREATH: ICD-10-CM

## 2025-02-18 LAB
BH CV STRESS DURATION MIN STAGE 1: 3
BH CV STRESS DURATION SEC STAGE 1: 0
BH CV STRESS GRADE STAGE 1: 10
BH CV STRESS METS STAGE 1: 5
BH CV STRESS PROTOCOL 1: NORMAL
BH CV STRESS RECOVERY BP: NORMAL MMHG
BH CV STRESS RECOVERY HR: 97 BPM
BH CV STRESS SPEED STAGE 1: 1.7
BH CV STRESS STAGE 1: 1
MAXIMAL PREDICTED HEART RATE: 198 BPM
PERCENT MAX PREDICTED HR: 78.28 %
STRESS BASELINE BP: NORMAL MMHG
STRESS BASELINE HR: 71 BPM
STRESS PERCENT HR: 92 %
STRESS POST ESTIMATED WORKLOAD: 10.1 METS
STRESS POST EXERCISE DUR MIN: 8 MIN
STRESS POST EXERCISE DUR SEC: 59 SEC
STRESS POST PEAK BP: NORMAL MMHG
STRESS POST PEAK HR: 155 BPM
STRESS TARGET HR: 168 BPM

## 2025-02-18 PROCEDURE — 93018 CV STRESS TEST I&R ONLY: CPT | Performed by: INTERNAL MEDICINE

## 2025-02-18 PROCEDURE — 93306 TTE W/DOPPLER COMPLETE: CPT

## 2025-02-18 PROCEDURE — 93017 CV STRESS TEST TRACING ONLY: CPT

## 2025-02-19 LAB
AV MEAN PRESS GRAD SYS DOP V1V2: 3.2 MMHG
AV VMAX SYS DOP: 122.2 CM/SEC
BH CV ECHO MEAS - ACS: 2.18 CM
BH CV ECHO MEAS - AO MAX PG: 6 MMHG
BH CV ECHO MEAS - AO ROOT DIAM: 2.9 CM
BH CV ECHO MEAS - AO V2 VTI: 25.6 CM
BH CV ECHO MEAS - EDV(CUBED): 76.8 ML
BH CV ECHO MEAS - EDV(MOD-SP4): 79.5 ML
BH CV ECHO MEAS - EF(MOD-SP4): 68.1 %
BH CV ECHO MEAS - ESV(CUBED): 18.6 ML
BH CV ECHO MEAS - ESV(MOD-SP4): 25.4 ML
BH CV ECHO MEAS - FS: 37.6 %
BH CV ECHO MEAS - IVS/LVPW: 1.07 CM
BH CV ECHO MEAS - IVSD: 1.09 CM
BH CV ECHO MEAS - LA DIMENSION: 3 CM
BH CV ECHO MEAS - LAT PEAK E' VEL: 21.4 CM/SEC
BH CV ECHO MEAS - LV DIASTOLIC VOL/BSA (35-75): 50.5 CM2
BH CV ECHO MEAS - LV MASS(C)D: 150.8 GRAMS
BH CV ECHO MEAS - LV SYSTOLIC VOL/BSA (12-30): 16.1 CM2
BH CV ECHO MEAS - LVIDD: 4.3 CM
BH CV ECHO MEAS - LVIDS: 2.7 CM
BH CV ECHO MEAS - LVPWD: 1.02 CM
BH CV ECHO MEAS - MED PEAK E' VEL: 13.3 CM/SEC
BH CV ECHO MEAS - MV A MAX VEL: 41.6 CM/SEC
BH CV ECHO MEAS - MV DEC TIME: 0.15 SEC
BH CV ECHO MEAS - MV E MAX VEL: 84.8 CM/SEC
BH CV ECHO MEAS - MV E/A: 2.04
BH CV ECHO MEAS - RVDD: 2.7 CM
BH CV ECHO MEAS - SV(MOD-SP4): 54.1 ML
BH CV ECHO MEAS - SVI(MOD-SP4): 34.3 ML/M2
BH CV ECHO MEASUREMENTS AVERAGE E/E' RATIO: 4.89
LV EF 3D SEGMENTATION: 57 %

## 2025-02-20 ENCOUNTER — TELEPHONE (OUTPATIENT)
Dept: CARDIOLOGY | Facility: CLINIC | Age: 25
End: 2025-02-20
Payer: COMMERCIAL

## 2025-02-20 NOTE — TELEPHONE ENCOUNTER
Caller: Dagoberto Henley    Relationship: Self    Best call back number: [phone]     What is the best time to reach you: ANYTIME    Who are you requesting to speak with (clinical staff, provider,  specific staff member): PROVIDER    Do you know the name of the person who called: NA    What was the call regarding: PT'S RESULTS UPLOADED INTO HIS Web [employer], WOULD LIKE A CALL BACK RELAYING RESULTS ONCE JR HAS HAD A CHANCE TO READ AND INTERPRET OR CALL PUSHING UP APPT TO COME IN IF NECESSARY BEFORE JUNE. PLEASE ADVISE.     Is it okay if the provider responds through [employer]: NO

## 2025-02-21 ENCOUNTER — OFFICE VISIT (OUTPATIENT)
Dept: GASTROENTEROLOGY | Facility: CLINIC | Age: 25
End: 2025-02-21
Payer: COMMERCIAL

## 2025-02-21 VITALS
HEART RATE: 63 BPM | SYSTOLIC BLOOD PRESSURE: 130 MMHG | DIASTOLIC BLOOD PRESSURE: 80 MMHG | WEIGHT: 118.8 LBS | HEIGHT: 65 IN | BODY MASS INDEX: 19.79 KG/M2

## 2025-02-21 DIAGNOSIS — R13.19 ESOPHAGEAL DYSPHAGIA: ICD-10-CM

## 2025-02-21 DIAGNOSIS — R93.5 ABNORMAL CT OF THE ABDOMEN: ICD-10-CM

## 2025-02-21 DIAGNOSIS — K58.1 IRRITABLE BOWEL SYNDROME WITH CONSTIPATION: Primary | ICD-10-CM

## 2025-02-21 PROCEDURE — 1160F RVW MEDS BY RX/DR IN RCRD: CPT

## 2025-02-21 PROCEDURE — 1159F MED LIST DOCD IN RCRD: CPT

## 2025-02-21 PROCEDURE — 99214 OFFICE O/P EST MOD 30 MIN: CPT

## 2025-02-21 PROCEDURE — 3079F DIAST BP 80-89 MM HG: CPT

## 2025-02-21 PROCEDURE — 3075F SYST BP GE 130 - 139MM HG: CPT

## 2025-02-21 RX ORDER — BISACODYL 5 MG/1
20 TABLET, DELAYED RELEASE ORAL ONCE
Qty: 4 TABLET | Refills: 0 | Status: SHIPPED | OUTPATIENT
Start: 2025-02-21 | End: 2025-02-21

## 2025-02-21 RX ORDER — POLYETHYLENE GLYCOL 3350 17 G/17G
POWDER, FOR SOLUTION ORAL
Qty: 510 G | Refills: 0 | Status: SHIPPED | OUTPATIENT
Start: 2025-02-21

## 2025-02-21 RX ORDER — ALUMINUM ZIRCONIUM OCTACHLOROHYDREX GLY 16 G/100G
GEL TOPICAL 2 TIMES DAILY
Qty: 60 PACKET | Refills: 12 | Status: SHIPPED | OUTPATIENT
Start: 2025-02-21

## 2025-02-21 NOTE — PROGRESS NOTES
"Chief Complaint  Constipation    Dagoberto Henley is a 24 y.o. male who presents today to [facility] for Constipation.    HPI:   24-year-old male who presents today for evaluation of constipation.  Patient states that on average he has 2-3 bowel movements per week that he rates as BSS 1, 3, or 7.  States that he takes MiraLAX \"whenever I start cramping.\"  He notes abdominal cramping before and after having a bowel movement.  States that this is slightly relieved with complete evacuation of his colon.  He is concerned that he has diarrhea that is much worse if he drinks Gatorade or plays basketball.  States that while he is playing basketball he often has to leave in order to have a bowel movement.  He was initiated on Linzess 72 mcg.  Patient presented to the ED on 2/7/2025 with generalized abdominal pain.  CT abdomen pelvis was notable for significant rectal fecal impaction with rectal wall thickening.       Interval History:    Today, patient presents for ED follow-up.  Patient states that he had not been taking his Linzess 72 mcg.  He states that he had been prescribed this medication as a child and it did not work.  Patient did not mention this on last appointment.  He reports that his bowel movements have been daily since ED evaluation.  States that he received an enema in the ER.  Since, he is having 3-4 bowel movements per day that he rates as BSS 1.  States that he has been eating a lot of fiber and drinking a lot of water.  He takes 1 capful of MiraLAX per day.  He notes having trouble swallowing medication and solids.  States that he feels that is getting stuck in his midesophagus.  He denies unintentional weight loss, nausea, vomiting, abdominal pain, melena, and hematochezia.      Previous History:   Past Medical History:   Diagnosis Date    Anxiety and depression     Hypertension     Stomach disease       Past Surgical History:   Procedure Laterality Date    MOUTH " "SURGERY      OTHER SURGICAL HISTORY      had dental procedure      Social History     Socioeconomic History    Marital status: Unknown   Tobacco Use    Smoking status: Never    Smokeless tobacco: Never   Vaping Use    Vaping status: Never Used   Substance and Sexual Activity    Alcohol use: Never    Drug use: Never    Sexual activity: Defer        Current Medications:  Current Outpatient Medications   Medication Sig Dispense Refill    busPIRone (BUSPAR) 10 MG tablet Take 1 tablet by mouth Every 12 (Twelve) Hours.      cloNIDine (CATAPRES) 0.1 MG tablet Take 1 tablet by mouth 2 (Two) Times a Day As Needed for High Blood Pressure (if blood pressure is greater than 160/90).      losartan (Cozaar) 25 MG tablet Take 0.5 tablets by mouth Daily. 15 tablet 6    bisacodyl (DULCOLAX) 5 MG EC tablet Take 4 tablets by mouth 1 (One) Time for 1 dose. Take 4 tablets at 12:00 PM the day before procedure 4 tablet 0    polyethylene glycol (MIRALAX) 17 GM/SCOOP powder Take 255 g Miralax mixed with 32 oz clear liquid at 12pm night before procedure then repeat 6 hours later. 510 g 0    psyllium (Metamucil Smooth Texture) 58.6 % powder Take  by mouth 2 (Two) Times a Day. 60 packet 12     No current facility-administered medications for this visit.       Allergies:   Allergies   Allergen Reactions    Amoxicillin Hives    Benadryl [Diphenhydramine] Hives    Penicillins Hives       Vitals:   /80   Pulse 63   Ht 165.1 cm (65\")   Wt 53.9 kg (118 lb 12.8 oz)   BMI 19.77 kg/m²   Estimated body mass index is 19.77 kg/m² as calculated from the following:    Height as of this encounter: 165.1 cm (65\").    Weight as of this encounter: 53.9 kg (118 lb 12.8 oz).    ROS:   Review of Systems   Constitutional: Negative.    HENT: Negative.     Respiratory: Negative.     Cardiovascular: Negative.    Gastrointestinal:  Positive for constipation. Negative for abdominal distention, abdominal pain, anal bleeding, blood in stool, diarrhea, nausea, " rectal pain and vomiting.   All other systems reviewed and are negative.       Physical Exam:   Physical Exam  Vitals reviewed.   Constitutional:       General: He is not in acute distress.     Appearance: Normal appearance. He is well-groomed and normal weight. He is not toxic-appearing.   HENT:      Head: Normocephalic and atraumatic.      Nose: Nose normal.      Mouth/Throat:      Mouth: Mucous membranes are moist.   Eyes:      Extraocular Movements: Extraocular movements intact.   Cardiovascular:      Rate and Rhythm: Normal rate and regular rhythm.      Heart sounds: Normal heart sounds. No murmur heard.  Pulmonary:      Effort: Pulmonary effort is normal. No respiratory distress.      Breath sounds: Normal breath sounds. No stridor. No wheezing or rales.   Abdominal:      General: Bowel sounds are normal. There is no distension.      Palpations: Abdomen is soft. There is no mass.      Tenderness: There is no abdominal tenderness. There is no guarding or rebound.      Hernia: No hernia is present.   Skin:     General: Skin is warm.      Coloration: Skin is not jaundiced.      Findings: No rash.   Neurological:      Mental Status: He is alert and oriented to person, place, and time.   Psychiatric:         Mood and Affect: Mood and affect normal.         Speech: Speech normal.         Behavior: Behavior normal. Behavior is cooperative.         Thought Content: Thought content normal.          Lab Results:   Lab Results   Component Value Date    WBC 7.46 02/07/2025    HGB 15.4 02/07/2025    HCT 45.5 02/07/2025    MCV 86.3 02/07/2025    RDW 12.4 02/07/2025     02/07/2025    NEUTRORELPCT 66.6 02/07/2025    LYMPHORELPCT 22.5 02/07/2025    MONORELPCT 6.6 02/07/2025    EOSRELPCT 3.4 02/07/2025    BASORELPCT 0.8 02/07/2025    NEUTROABS 4.97 02/07/2025    LYMPHSABS 1.68 02/07/2025       Lab Results   Component Value Date     02/07/2025    K 3.9 02/07/2025    CO2 27.6 02/07/2025     02/07/2025    BUN 12  02/07/2025    CREATININE 0.95 02/07/2025    GLUCOSE 104 (H) 02/07/2025    CALCIUM 9.5 02/07/2025    ALKPHOS 77 02/07/2025    AST 17 02/07/2025    ALT 10 02/07/2025    BILITOT 0.7 02/07/2025    ALBUMIN 4.6 02/07/2025    PROTEINTOT 7.8 02/07/2025       Pathology:        Endoscopy:        Imaging:   CT Abdomen Pelvis Stone Protocol    Result Date: 2/7/2025  Significant rectal fecal impaction. Rectal wall thickening.  This report was finalized on 2/7/2025 6:13 PM by Alex Pallas, DO.      XR Chest 1 View    Result Date: 1/12/2025   No acute process seen in the chest. No lobar consolidation or edema. No bronchial inflammation.  This report was finalized on 1/12/2025 7:22 PM by Jose Manuel Clement MD.         Results review: During today's encounter, all relevant clinical data has been reviewed.      Assessment and Plan    1. Irritable bowel syndrome with constipation (Primary)  2. Abnormal CT of the abdomen  CT abdomen pelvis performed in February 2025 was notable for rectal wall thickening and fecal impaction of the rectum.  Largely suspect rectal wall thickening is secondary to constipation.  Patient was encouraged to proceed with medical management versus colonoscopy.  At this time, patient wishes to proceed with colonoscopy.  He was educated on risk and benefits of colonoscopy.  He verbalized understanding and was amenable to proceeding as above.  Will initiate patient on Metamucil twice daily.  Encouraged patient to start taking MiraLAX twice daily as well.  Will discontinue Linzess 72 mcg as patient reports he never picked this up.  -     psyllium (Metamucil Smooth Texture) 58.6 % powder; Take  by mouth 2 (Two) Times a Day.  Dispense: 60 packet; Refill: 12  -     Case Request; Standing  -     Follow Anesthesia Guidelines / Protocol; Future  -     Case Request  -     polyethylene glycol (MIRALAX) 17 GM/SCOOP powder; Take 255 g Miralax mixed with 32 oz clear liquid at 12pm night before procedure then repeat 6 hours later.   Dispense: 510 g; Refill: 0  -     bisacodyl (DULCOLAX) 5 MG EC tablet; Take 4 tablets by mouth 1 (One) Time for 1 dose. Take 4 tablets at 12:00 PM the day before procedure  Dispense: 4 tablet; Refill: 0    3. Esophageal dysphagia  We will proceed with esophagram followed by EGD.  Patient was educated on risk and benefits of procedure.  He verbalized understanding and was amenable to proceeding as above.  -     Case Request; Standing  -     Follow Anesthesia Guidelines / Protocol; Future  -     Case Request  -     FL ESOPHAGRAM SINGLE CONTRAST; Future      New Medications:   New Medications Ordered This Visit   Medications    psyllium (Metamucil Smooth Texture) 58.6 % powder     Sig: Take  by mouth 2 (Two) Times a Day.     Dispense:  60 packet     Refill:  12    polyethylene glycol (MIRALAX) 17 GM/SCOOP powder     Sig: Take 255 g Miralax mixed with 32 oz clear liquid at 12pm night before procedure then repeat 6 hours later.     Dispense:  510 g     Refill:  0    bisacodyl (DULCOLAX) 5 MG EC tablet     Sig: Take 4 tablets by mouth 1 (One) Time for 1 dose. Take 4 tablets at 12:00 PM the day before procedure     Dispense:  4 tablet     Refill:  0       Discontinued Medications:   Medications Discontinued During This Encounter   Medication Reason    linaclotide (Linzess) 72 MCG capsule capsule Patient Reported Not Taking        Visit Diagnoses:    ICD-10-CM ICD-9-CM   1. Irritable bowel syndrome with constipation  K58.1 564.1   2. Abnormal CT of the abdomen  R93.5 793.6   3. Esophageal dysphagia  R13.19 787.29            Follow Up:   Return in about 3 months (around 5/21/2025).    The patient was in agreement with the plan and all questions were answered to patient's satisfaction.        This document has been electronically signed by Janell Bales PA-C   February 21, 2025 16:06 EST    Dictated Utilizing Dragon Dictation: Part of this note may be an electronic transcription/translation of spoken language to printed text  using the Dragon Dictation System.    CC:  No ref. provider found  Jaylin Mcmahon APRN

## 2025-02-24 ENCOUNTER — TELEPHONE (OUTPATIENT)
Dept: CARDIOLOGY | Facility: CLINIC | Age: 25
End: 2025-02-24
Payer: COMMERCIAL

## 2025-02-24 NOTE — TELEPHONE ENCOUNTER
----- Message from Steff MCNEIL sent at 2/21/2025  1:22 PM EST -----  Regarding: FW:    ----- Message -----  From: Maria A Figueroa MA  Sent: 2/20/2025  11:43 AM EST  To: NAOMI Sánchez  Subject: FW:                                                ----- Message -----  From: Kingston James APRN  Sent: 2/19/2025  10:11 PM EST  To: Maria A Figueroa MA  Subject: FW:                                              There is no acute findings on the echocardiogram.  Keep follow-up.  ----- Message -----  From: Jose Manuel Arroyo MD  Sent: 2/19/2025   7:47 PM EST  To: SENTHIL Kirby

## 2025-02-26 ENCOUNTER — TELEPHONE (OUTPATIENT)
Dept: CARDIOLOGY | Facility: CLINIC | Age: 25
End: 2025-02-26
Payer: COMMERCIAL

## 2025-02-26 NOTE — TELEPHONE ENCOUNTER
Received cardiac clearance request from DR BRANDT stating pt has EGD AND COLONOSCOPY scheduled for 04/23/2025 and is requiring a cardiac clearance. Placed cardiac clearance request in ROB'S inbox to review and address with provider.

## 2025-03-03 ENCOUNTER — TELEPHONE (OUTPATIENT)
Dept: CARDIOLOGY | Facility: CLINIC | Age: 25
End: 2025-03-03
Payer: COMMERCIAL

## 2025-03-03 NOTE — TELEPHONE ENCOUNTER
I called patient and let him know that JR would like for him to have a sooner apt to go over stress test results. I did send  message to  to get him scheduled.

## 2025-03-03 NOTE — TELEPHONE ENCOUNTER
Caller: Dagoberto Henley    Relationship: Self    Best call back number: [phone]    What is the best time to reach you: ANY      What was the call regarding: PATIENT ASKING ABOUT SOONER APPT. ADVISED THERE IS 48 HOUR CALL BACK TIME.     Is it okay if the provider responds through MyChart: NO

## 2025-03-04 ENCOUNTER — TELEPHONE (OUTPATIENT)
Dept: CARDIOLOGY | Facility: CLINIC | Age: 25
End: 2025-03-04
Payer: COMMERCIAL

## 2025-03-04 ENCOUNTER — OFFICE VISIT (OUTPATIENT)
Dept: CARDIOLOGY | Facility: CLINIC | Age: 25
End: 2025-03-04
Payer: COMMERCIAL

## 2025-03-04 VITALS
HEART RATE: 61 BPM | DIASTOLIC BLOOD PRESSURE: 84 MMHG | WEIGHT: 114 LBS | OXYGEN SATURATION: 99 % | SYSTOLIC BLOOD PRESSURE: 129 MMHG | BODY MASS INDEX: 18.99 KG/M2 | HEIGHT: 65 IN

## 2025-03-04 DIAGNOSIS — R00.2 PALPITATIONS: ICD-10-CM

## 2025-03-04 DIAGNOSIS — R06.02 SHORTNESS OF BREATH: ICD-10-CM

## 2025-03-04 DIAGNOSIS — R94.39 ABNORMAL STRESS ECG WITH TREADMILL: ICD-10-CM

## 2025-03-04 DIAGNOSIS — R07.2 PRECORDIAL PAIN: Primary | ICD-10-CM

## 2025-03-04 DIAGNOSIS — I10 PRIMARY HYPERTENSION: ICD-10-CM

## 2025-03-04 PROCEDURE — 99214 OFFICE O/P EST MOD 30 MIN: CPT | Performed by: CLINICAL NURSE SPECIALIST

## 2025-03-04 PROCEDURE — 3079F DIAST BP 80-89 MM HG: CPT | Performed by: CLINICAL NURSE SPECIALIST

## 2025-03-04 PROCEDURE — 3074F SYST BP LT 130 MM HG: CPT | Performed by: CLINICAL NURSE SPECIALIST

## 2025-03-04 RX ORDER — SODIUM CHLORIDE 9 MG/ML
40 INJECTION, SOLUTION INTRAVENOUS AS NEEDED
OUTPATIENT
Start: 2025-03-04

## 2025-03-04 RX ORDER — SODIUM CHLORIDE 0.9 % (FLUSH) 0.9 %
10 SYRINGE (ML) INJECTION AS NEEDED
OUTPATIENT
Start: 2025-03-04

## 2025-03-04 RX ORDER — METOPROLOL TARTRATE 50 MG
50 TABLET ORAL ONCE
Qty: 1 TABLET | Refills: 0 | Status: SHIPPED | OUTPATIENT
Start: 2025-03-04 | End: 2025-03-04

## 2025-03-04 RX ORDER — NITROGLYCERIN 0.4 MG/1
0.4 TABLET SUBLINGUAL
OUTPATIENT
Start: 2025-03-04 | End: 2025-03-04

## 2025-03-04 RX ORDER — SODIUM CHLORIDE 0.9 % (FLUSH) 0.9 %
10 SYRINGE (ML) INJECTION EVERY 12 HOURS SCHEDULED
OUTPATIENT
Start: 2025-03-04

## 2025-03-04 NOTE — PROGRESS NOTES
Subjective     Dagoberto Henley is a 24 y.o. male who presents today for Chest Pain (Abn stress test and echo follow up), Palpitations, and Shortness of Breath.    CHIEF COMPLIANT  Chief Complaint   Patient presents with    Chest Pain     Abn stress test and echo follow up    Palpitations    Shortness of Breath       Active Problems:  Problem list  1.  Chest pain  1.1 Treadmill stress 2/18/25: At peak exercise there is 1 mm for subtle to downsloping ST segment depression in lead III with a lesser degree of depression in the other inferior leads as well as lateral precordial leads. ST segment depression was no longer significant at 1 minute into recovery. There was a single PVC during exercise. There were moderately frequent PVCs in recovery including intercalated beats. There was no sustained ectopy or block recorded.   2.  Shortness of breath  2.1 Echocardiogram 2/18/25: ejection fraction is 55 to 60%. Normal LV diastolic function and filling pressures. Valves are morphologically and physiologically normal.   3.  Palpitations  4.  Hypertension    HPI  The patient is a 24-year-old male that returns for follow-up to discuss recent testing.  The patient has been having intermittent chest pain which he describes as a pressure in the center of his chest.  When this occurs he will have associated dyspnea.  He underwent a treadmill stress test which at peak exercise there is 1 mm for subtle to downsloping ST segment depression in lead III with a lesser degree of depression in the other inferior leads as well as lateral precordial leads. ST segment depression was no longer significant at 1 minute into recovery. There was a single PVC during exercise. There were moderately frequent PVCs in recovery including intercalated beats. There was no sustained ectopy or block recorded.  Echocardiogram 2/18/25: ejection fraction is 55 to 60%. Normal LV diastolic function and filling pressures. Valves are morphologically and physiologically  normal.   The patient states he continues to have intermittent chest pain symptoms which is happening at least 2-3 times per week.  He states when the chest pain occurs it will happen 2-3 times in that day.  He says it will start randomly and will subside without intervention.  He states it will last anywhere from a few seconds to a couple minutes.  He does have associated dyspnea and will occasionally feel a palpitation.  He denies syncope, near syncope.    PRIOR MEDS  Current Outpatient Medications on File Prior to Visit   Medication Sig Dispense Refill    cloNIDine (CATAPRES) 0.1 MG tablet Take 1 tablet by mouth 2 (Two) Times a Day As Needed for High Blood Pressure (if blood pressure is greater than 160/90).      losartan (Cozaar) 25 MG tablet Take 0.5 tablets by mouth Daily. 15 tablet 6    polyethylene glycol (MIRALAX) 17 GM/SCOOP powder Take 255 g Miralax mixed with 32 oz clear liquid at 12pm night before procedure then repeat 6 hours later. 510 g 0    [DISCONTINUED] busPIRone (BUSPAR) 10 MG tablet Take 1 tablet by mouth Every 12 (Twelve) Hours.      [DISCONTINUED] psyllium (Metamucil Smooth Texture) 58.6 % powder Take  by mouth 2 (Two) Times a Day. 60 packet 12     No current facility-administered medications on file prior to visit.       ALLERGIES  Amoxicillin, Benadryl [diphenhydramine], and Penicillins    HISTORY  Past Medical History:   Diagnosis Date    Anxiety and depression     Hypertension     Stomach disease        Social History     Socioeconomic History    Marital status: Single   Tobacco Use    Smoking status: Never    Smokeless tobacco: Never   Vaping Use    Vaping status: Never Used   Substance and Sexual Activity    Alcohol use: Never    Drug use: Never    Sexual activity: Defer       Family History   Problem Relation Age of Onset    Kidney failure Mother     COPD Mother     Heart failure Mother     No Known Problems Other        Review of Systems   Constitutional:  Positive for fatigue.  "Negative for chills, diaphoresis and fever.   Eyes: Negative.    Respiratory:  Positive for shortness of breath (wiht activity and at rest). Negative for apnea, cough, chest tightness and wheezing.    Cardiovascular:  Positive for chest pain (occas midsternal pain) and palpitations (racing, with activity and at rest). Negative for leg swelling.   Gastrointestinal: Negative.  Negative for blood in stool.   Endocrine: Negative.    Genitourinary: Negative.  Negative for hematuria.   Musculoskeletal:  Positive for arthralgias. Negative for back pain, myalgias and neck pain.   Skin: Negative.    Allergic/Immunologic: Negative.    Neurological:  Positive for dizziness (random episodes) and headaches. Negative for syncope, weakness, light-headedness and numbness.   Hematological: Negative.  Does not bruise/bleed easily.   Psychiatric/Behavioral:  Positive for sleep disturbance (SOA and saud).        Objective     VITALS: /84 (BP Location: Right arm, Patient Position: Sitting)   Pulse 61   Ht 165.1 cm (65\")   Wt 51.7 kg (114 lb)   SpO2 99%   BMI 18.97 kg/m²     LABS:   Lab Results (most recent)       None            IMAGING:   CT Abdomen Pelvis Stone Protocol    Result Date: 2/7/2025  Significant rectal fecal impaction. Rectal wall thickening.  This report was finalized on 2/7/2025 6:13 PM by Alex Pallas, DO.      XR Chest 1 View    Result Date: 1/12/2025   No acute process seen in the chest. No lobar consolidation or edema. No bronchial inflammation.  This report was finalized on 1/12/2025 7:22 PM by Jose Manuel Clement MD.        EXAM:  Physical Exam  Constitutional:       Appearance: Normal appearance.   Eyes:      Pupils: Pupils are equal, round, and reactive to light.   Cardiovascular:      Rate and Rhythm: Normal rate and regular rhythm.      Pulses:           Carotid pulses are 2+ on the right side and 2+ on the left side.       Radial pulses are 2+ on the right side and 2+ on the left side.        Dorsalis " pedis pulses are 2+ on the right side and 2+ on the left side.        Posterior tibial pulses are 2+ on the right side and 2+ on the left side.      Heart sounds: Normal heart sounds.   Pulmonary:      Effort: Pulmonary effort is normal.      Breath sounds: Normal breath sounds.   Abdominal:      General: Bowel sounds are normal.      Palpations: Abdomen is soft.   Musculoskeletal:      Right lower leg: No edema.      Left lower leg: No edema.   Skin:     General: Skin is warm and dry.      Capillary Refill: Capillary refill takes less than 2 seconds.   Neurological:      General: No focal deficit present.      Mental Status: He is alert and oriented to person, place, and time.   Psychiatric:         Mood and Affect: Mood normal.         Thought Content: Thought content normal.         Procedure   Procedures       Assessment & Plan    Diagnosis Plan   1. Precordial pain  CT Angiogram Coronary    nitroglycerin (NITROSTAT) SL tablet 0.4 mg    sodium chloride 0.9 % flush 10 mL    sodium chloride 0.9 % flush 10 mL    sodium chloride 0.9 % infusion 40 mL      2. Abnormal stress ECG with treadmill  CT Angiogram Coronary      3. Shortness of breath        4. Primary hypertension        5. Palpitations          Plan:  Precordial pain: Patient continues to have intermittent chest pain symptoms.  Treadmill stress test showed at peak exercise there is 1 mm for subtle to downsloping ST segment depression in lead III with a lesser degree of depression in the other inferior leads as well as lateral precordial leads. ST segment depression was no longer significant at 1 minute into recovery.  Will proceed with cardiac CTA to further define coronary anatomy due to ongoing symptoms.  Will see the patient back after testing to review results.  Abnormal treadmill stress: We will proceed with cardiac CTA as discussed above.  Shortness of breath: Echocardiogram showed normal LV systolic and diastolic function.  Will proceed with cardiac  CTA to further define coronary anatomy.  Primary hypertension: Blood pressures come under better control with the addition of losartan.  The patient was instructed to monitor BP at home and to notify our office if systolic BP is consistently greater than 130-135 systolic.  Goal BP is 130-135/70-80.  Palpitations: Recent monitor showed sinus arrhythmia with minimal PAC and PVC burden.  Will continue to monitor.    Return for Keep follow up as scheduled .    Dagoberto Henley  reports that he has never smoked. He has never used smokeless tobacco.          MEDS ORDERED DURING VISIT:  New Medications Ordered This Visit   Medications    metoprolol tartrate (LOPRESSOR) 50 MG tablet     Sig: Take 1 tablet by mouth 1 (One) Time for 1 dose. Take 50 mg One (1) Hour Prior to Coronary CTA     Dispense:  1 tablet     Refill:  0       DISCONTINUED MEDS DURING VISIT:   Medications Discontinued During This Encounter   Medication Reason    busPIRone (BUSPAR) 10 MG tablet Discontinued by another clinician    psyllium (Metamucil Smooth Texture) 58.6 % powder Discontinued by another clinician          This document has been electronically signed by SENTHIL Nam  March 4, 2025 13:22 EST    Dictated Utilizing Dragon Dictation: Part of this note may be an electronic transcription/translation of spoken language to printed text using the Dragon Dictation System

## 2025-03-10 ENCOUNTER — PATIENT MESSAGE (OUTPATIENT)
Dept: CARDIOLOGY | Facility: CLINIC | Age: 25
End: 2025-03-10
Payer: COMMERCIAL

## 2025-03-10 NOTE — TELEPHONE ENCOUNTER
Patient notified.    Addie James APRN to Me (Selected Message)      3/10/25  4:47 PM  At his last visit he was scheduled for a cardiac CTA.  If he is getting lightheaded with exercise I would have him refrain until we get his testing done and see him back in follow up after the testing.

## 2025-03-29 ENCOUNTER — HOSPITAL ENCOUNTER (OUTPATIENT)
Dept: CT IMAGING | Facility: HOSPITAL | Age: 25
Discharge: HOME | End: 2025-03-29
Payer: COMMERCIAL

## 2025-03-29 VITALS — HEART RATE: 97 BPM | SYSTOLIC BLOOD PRESSURE: 120 MMHG | DIASTOLIC BLOOD PRESSURE: 57 MMHG

## 2025-03-29 DIAGNOSIS — R94.39 ABNORMAL STRESS ECG WITH TREADMILL: ICD-10-CM

## 2025-03-29 DIAGNOSIS — R07.2 PRECORDIAL PAIN: ICD-10-CM

## 2025-03-29 PROCEDURE — 25510000001 IOPAMIDOL PER 1 ML: Performed by: CLINICAL NURSE SPECIALIST

## 2025-03-29 PROCEDURE — 75574 CT ANGIO HRT W/3D IMAGE: CPT

## 2025-03-29 RX ORDER — IOPAMIDOL 755 MG/ML
100 INJECTION, SOLUTION INTRAVASCULAR
Status: COMPLETED | OUTPATIENT
Start: 2025-03-29 | End: 2025-03-29

## 2025-03-29 RX ORDER — NITROGLYCERIN 0.4 MG/1
0.4 TABLET SUBLINGUAL
Status: DISCONTINUED | OUTPATIENT
Start: 2025-03-29 | End: 2025-03-30 | Stop reason: HOSPADM

## 2025-03-29 RX ADMIN — IOPAMIDOL 80 ML: 755 INJECTION, SOLUTION INTRAVENOUS at 14:37

## 2025-03-31 ENCOUNTER — TELEPHONE (OUTPATIENT)
Dept: CARDIOLOGY | Facility: CLINIC | Age: 25
End: 2025-03-31
Payer: COMMERCIAL

## 2025-03-31 NOTE — TELEPHONE ENCOUNTER
The patient was seen in the ER on Saturday at [facility] in [facility]. Please get the patient scheduled for a hospital follow up appointment.

## 2025-03-31 NOTE — TELEPHONE ENCOUNTER
Caller: Dagoberto Henley    Relationship: Self    Best call back number: [phone]     What is the best time to reach you: ANY     What was the call regarding: PT REPORTS THAT THEY WERE GIVEN A CALL BUT THERE IS NO DOCUMENTATION IN THE CHART ABOUT THIS. IF THIS WAS NOT A MISTAKE, PLEASE CALL PT BACK.     THINKS THIS WAS ABOUT SCHD     NO HUB TO SCHD OR RELAY IN MESSAGE     Is it okay if the provider responds through MyChart: CALL

## 2025-03-31 NOTE — TELEPHONE ENCOUNTER
Kingston James APRN to Me (Selected Message)    3/31/25 10:44 AM  1.  No evidence of a pulmonary embolus.  2.  No aortic dissection.  3.  High-grade stenosis in the LAD also appears to be artifactual.  4.  Total calcium score 0.  5.  Tricuspid aortic valve without calcification.  6.  There is discontinuity of the RCA appears to be artifactual.     The message did not message and why he would not want to go to the ER and walk on the symptoms he is having.  Can you please update this he will need sooner follow-up to discuss

## 2025-03-31 NOTE — TELEPHONE ENCOUNTER
Caller: Dagoberto Henley    Relationship: Self    Best call back number: [phone] GIRL FRIENDS NUMBER     Caller requesting test results: SELF     What test was performed: CT W/ CONTRAST     When was the test performed:3/29     Where was the test performed: [facility]     Additional notes:   ISAURO notified PT THAT RESULTS ARE BACK

## 2025-03-31 NOTE — TELEPHONE ENCOUNTER
"  Caller: Dagoberto Henley     Relationship: SELF    Best call back number: [phone]    What is your medical concern? INTERMITTENT CHEST PAINS AND SHORTNESS OF BREATH    How long has this issue been going on? \"A WHILE\"    Is your provider already aware of this issue? YES    Have you been treated for this issue? CT W/ CONTRAST COMPLETED ON 3/29/25    PATIENT DOES NOT WANT TO GO TO THE HOSPITAL BECAUSE LAST TIME HE WAS THERE, THEY DIDN'T DO ANYTHING TO HELP HIM. HE MAINLY WANTS TO KNOW THE RESULTS FROM HIS CTA IF THEY'RE AVAILABLE. PLEASE CALL HIM BACK WHEN POSSIBLE. THANK YOU      "

## 2025-04-01 ENCOUNTER — TELEPHONE (OUTPATIENT)
Dept: CARDIOLOGY | Facility: CLINIC | Age: 25
End: 2025-04-01
Payer: COMMERCIAL

## 2025-04-01 NOTE — TELEPHONE ENCOUNTER
Caller: Dagoberto Henley    Relationship: Self    Best call back number: [phone]    What is the best time to reach you: ANYTIME    Who are you requesting to speak with (clinical staff, provider,  specific staff member):       What was the call regarding: PT STATES HE WAS SUPPOSED TO RECEIVE A CALL TO SCHEDULE EARLIER FU VISIT WITH JENNA. PLEASE CALL PT WHEN AVAILABLE.

## 2025-04-08 ENCOUNTER — TELEPHONE (OUTPATIENT)
Dept: CARDIOLOGY | Facility: CLINIC | Age: 25
End: 2025-04-08
Payer: COMMERCIAL

## 2025-04-08 NOTE — TELEPHONE ENCOUNTER
Received cardiac clearance request from DR BRANDT stating pt has EGD/COLONOSCOPY scheduled for 04/23/2025 and is requiring a cardiac clearance. Placed cardiac clearance request in JORJE'S inbox to review and address with provider.

## 2025-04-11 ENCOUNTER — TELEPHONE (OUTPATIENT)
Dept: CARDIOLOGY | Facility: CLINIC | Age: 25
End: 2025-04-11
Payer: COMMERCIAL

## 2025-04-15 ENCOUNTER — TELEPHONE (OUTPATIENT)
Dept: CARDIOLOGY | Facility: CLINIC | Age: 25
End: 2025-04-15
Payer: COMMERCIAL

## 2025-04-15 NOTE — TELEPHONE ENCOUNTER
Caller: NATHALIA BROWN    Relationship: SELF    Best call back number: [phone]    What is the best time to reach you: ANY    Who are you requesting to speak with (clinical staff, provider,  specific staff member): CLINICAL    Do you know the name of the person who called: ANNE MARIE MARTI    What was the call regarding: SOONER APPOINTMENT WITH RASHIDA SOTO. WARM TRANSFERRED TO OFFICE    Is it okay if the provider responds through MyChart: CALL

## 2025-04-15 NOTE — TELEPHONE ENCOUNTER
Jaylin Mcmahon forwarded the patient's labs to our office for review. The patient is still complaining of chest pain and smothering.

## 2025-04-16 ENCOUNTER — OFFICE VISIT (OUTPATIENT)
Dept: CARDIOLOGY | Facility: CLINIC | Age: 25
End: 2025-04-16
Payer: COMMERCIAL

## 2025-04-16 VITALS
BODY MASS INDEX: 19.89 KG/M2 | OXYGEN SATURATION: 100 % | HEIGHT: 65 IN | SYSTOLIC BLOOD PRESSURE: 127 MMHG | WEIGHT: 119.4 LBS | HEART RATE: 60 BPM | DIASTOLIC BLOOD PRESSURE: 87 MMHG

## 2025-04-16 DIAGNOSIS — R07.89 CHEST PAIN, ATYPICAL: ICD-10-CM

## 2025-04-16 DIAGNOSIS — I10 PRIMARY HYPERTENSION: ICD-10-CM

## 2025-04-16 DIAGNOSIS — R00.2 PALPITATIONS: Primary | ICD-10-CM

## 2025-04-16 PROCEDURE — 3074F SYST BP LT 130 MM HG: CPT | Performed by: CLINICAL NURSE SPECIALIST

## 2025-04-16 PROCEDURE — 99214 OFFICE O/P EST MOD 30 MIN: CPT | Performed by: CLINICAL NURSE SPECIALIST

## 2025-04-16 PROCEDURE — 3079F DIAST BP 80-89 MM HG: CPT | Performed by: CLINICAL NURSE SPECIALIST

## 2025-04-16 RX ORDER — ERGOCALCIFEROL 1.25 MG/1
CAPSULE, LIQUID FILLED ORAL
COMMUNITY
Start: 2025-04-11

## 2025-04-16 RX ORDER — RANOLAZINE 500 MG/1
500 TABLET, EXTENDED RELEASE ORAL 2 TIMES DAILY
Qty: 60 TABLET | Refills: 6 | Status: SHIPPED | OUTPATIENT
Start: 2025-04-16

## 2025-04-16 NOTE — PROGRESS NOTES
Subjective     Dagoberto Henley is a 24 y.o. male who presents today for Follow-up (ER F/U- [facility] /CTA 3/29/25).    CHIEF COMPLIANT  Chief Complaint   Patient presents with    Follow-up     ER F/U- [facility]   CTA 3/29/25       Active Problems:  1.  Chest pain  1.1 Treadmill stress 2/18/25: At peak exercise there is 1 mm for subtle to downsloping ST segment depression in lead III with a lesser degree of depression in the other inferior leads as well as lateral precordial leads. ST segment depression was no longer significant at 1 minute into recovery. There was a single PVC during exercise. There were moderately frequent PVCs in recovery including intercalated beats. There was no sustained ectopy or block recorded.   1.2 coronary CTA 3/29/2025: No evidence PTE, no aortic dissection, high-grade stenosis in LAD appears to be artifactual, total calcium score 0, discontinuity of RCA appears to be artifactual  2.  Shortness of breath  2.1 Echocardiogram 2/18/25: ejection fraction is 55 to 60%. Normal LV diastolic function and filling pressures. Valves are morphologically and physiologically normal.   3.  Palpitations  4.  Hypertension    HPI  The patient is a 24-year-old male that returns for follow-up to discuss recent cardiac CTA.  Since his last visit he has had an emergency department visit due to chest pain.  Workup in the emergency department was negative.  There was no significant stenosis per CTA.  There was an area in the LAD felt to be artifactual.  Neg for PTE.  No aortic dissection.  Total calcium score 0.    The patient continues to have intermittent chest pain symptoms.  He is scheduled to undergo GI workup with an EGD.  He denies syncope or near syncope.  The patient does state that they told him in the emergency department that his heart was skipping beats and that he could potentially have an arrhythmia.  He has continued to have palpitation symptoms.    PRIOR MEDS  Current Outpatient Medications on  File Prior to Visit   Medication Sig Dispense Refill    cloNIDine (CATAPRES) 0.1 MG tablet Take 1 tablet by mouth 2 (Two) Times a Day As Needed for High Blood Pressure (if blood pressure is greater than 160/90).      losartan (Cozaar) 25 MG tablet Take 0.5 tablets by mouth Daily. 15 tablet 6    polyethylene glycol (MIRALAX) 17 GM/SCOOP powder Take 255 g Miralax mixed with 32 oz clear liquid at 12pm night before procedure then repeat 6 hours later. (Patient taking differently: PRN) 510 g 0    vitamin D (ERGOCALCIFEROL) 1.25 MG (85448 UT) capsule capsule TAKE 1 CAPSULE BY MOUTH ONCE A WEEK ON THE SAME DAY EVERY WEEK      metoprolol tartrate (LOPRESSOR) 50 MG tablet Take 1 tablet by mouth 1 (One) Time for 1 dose. Take 50 mg One (1) Hour Prior to Coronary CTA 1 tablet 0    NON FORMULARY States he was given an rx for appetite, unsure of name, states he has not started the medicine yet.       No current facility-administered medications on file prior to visit.       ALLERGIES  Amoxicillin, Benadryl [diphenhydramine], Penicillins, and Nitrofuran derivatives    HISTORY  Past Medical History:   Diagnosis Date    Anxiety and depression     Hypertension     Stomach disease        Social History     Socioeconomic History    Marital status: Single   Tobacco Use    Smoking status: Never    Smokeless tobacco: Never   Vaping Use    Vaping status: Never Used   Substance and Sexual Activity    Alcohol use: Never    Drug use: Never    Sexual activity: Defer       Family History   Problem Relation Age of Onset    Kidney failure Mother     COPD Mother     Heart failure Mother     No Known Problems Other        Review of Systems   Constitutional:  Positive for fatigue.   Eyes:  Positive for visual disturbance (Glasses daily).   Respiratory:  Positive for shortness of breath (States he has difficulty breathing sometimes). Negative for chest tightness.    Cardiovascular:  Positive for chest pain (Some last night) and palpitations (States  "sometimes his heart races or beats really slow). Negative for leg swelling.        Pt states that he had CTA done and that the nitro caused numbness. States he had never had nitro before. Went to [facility] that day. Pt states they didn't find anything, other than \"heart pauses and skips.\"   Stated the following day he had CP.    Neurological:  Positive for dizziness (\" a lot\"). Negative for syncope, weakness, numbness and headaches.   Hematological:  Does not bruise/bleed easily.   Psychiatric/Behavioral:  Negative for sleep disturbance.        Objective     VITALS: /87   Pulse 60   Ht 165.1 cm (65\")   Wt 54.2 kg (119 lb 6.4 oz)   SpO2 100%   BMI 19.87 kg/m²     LABS:   Lab Results (most recent)       None            IMAGING:   CT Angiogram Coronary  Result Date: 3/30/2025  1.  No evidence of a pulmonary embolus. 2.  No aortic dissection. 3.  High-grade stenosis in the LAD also appears to be artifactual. 4.  Total calcium score 0. 5.  Tricuspid aortic valve without calcification. 6.  There is discontinuity of the RCA appears to be artifactual.  ASSESSMENT: CAD RADS N/P0  This report was finalized on 3/30/2025 8:47 AM by Dr. Domingo Templeton MD.      CT Abdomen Pelvis Stone Protocol  Result Date: 2/7/2025  Significant rectal fecal impaction. Rectal wall thickening.  This report was finalized on 2/7/2025 6:13 PM by Alex Pallas, DO.      XR Chest 1 View  Result Date: 1/12/2025   No acute process seen in the chest. No lobar consolidation or edema. No bronchial inflammation.  This report was finalized on 1/12/2025 7:22 PM by Jose Manuel Clement MD.        EXAM:  Physical Exam  Constitutional:       Appearance: Normal appearance.   Eyes:      Pupils: Pupils are equal, round, and reactive to light.   Cardiovascular:      Rate and Rhythm: Normal rate and regular rhythm.      Pulses:           Carotid pulses are 2+ on the right side and 2+ on the left side.       Radial pulses are 2+ on the right side and 2+ on the left " side.        Dorsalis pedis pulses are 2+ on the right side and 2+ on the left side.        Posterior tibial pulses are 2+ on the right side and 2+ on the left side.      Heart sounds: Normal heart sounds.   Pulmonary:      Effort: Pulmonary effort is normal.      Breath sounds: Normal breath sounds.   Abdominal:      General: Bowel sounds are normal.      Palpations: Abdomen is soft.   Musculoskeletal:      Right lower leg: No edema.      Left lower leg: No edema.   Skin:     General: Skin is warm and dry.      Capillary Refill: Capillary refill takes less than 2 seconds.   Neurological:      General: No focal deficit present.      Mental Status: He is alert and oriented to person, place, and time.   Psychiatric:         Mood and Affect: Mood normal.         Thought Content: Thought content normal.         Procedure   Procedures       Assessment & Plan    Diagnosis Plan   1. Palpitations  Cardiac Event Monitor (KULWINDER) or Mobile Cardiac Outpatient Telemetry (MCT)      2. Chest pain, atypical  ranolazine (Ranexa) 500 MG 12 hr tablet      3. Primary hypertension          Plan:  1.  Palpitations: Will place the patient on a 30-day cardiac monitor to further evaluate palpitation symptoms.  The patient was placed on a 3-day Holter monitor by primary care which showed no significant findings.  No longer monitoring period is needed due to ongoing symptoms.  2.  Atypical chest pain: We did discuss starting medication due to ongoing chest pain.  He is undergoing GI workup which could likely be the culprit of his pain.  The patient would like to start medication to see if this helps his symptoms.  He did not do well with nitroglycerin when it was given for CTA.  Will start Ranexa 500 mg p.o. twice daily.  Will plan to see him back to evaluate effectiveness.  3.  Primary hypertension: Continue losartan for now.  Depending on chest pain symptoms we may consider changing this to calcium channel blocker.    Return in about 3 months  (around 7/16/2025).    Dagoberto Henley  reports that he has never smoked. He has never used smokeless tobacco.     DO YOU VAPE? No.     BMI is within normal parameters. No other follow-up for BMI required.           MEDS ORDERED DURING VISIT:  New Medications Ordered This Visit   Medications    ranolazine (Ranexa) 500 MG 12 hr tablet     Sig: Take 1 tablet by mouth 2 (Two) Times a Day.     Dispense:  60 tablet     Refill:  6       DISCONTINUED MEDS DURING VISIT:   There are no discontinued medications.       This document has been electronically signed by SENTHIL Nam  April 17, 2025 16:54 EDT    Dictated Utilizing Dragon Dictation: Part of this note may be an electronic transcription/translation of spoken language to printed text using the Dragon Dictation System

## 2025-04-28 ENCOUNTER — TELEPHONE (OUTPATIENT)
Dept: CARDIOLOGY | Facility: CLINIC | Age: 25
End: 2025-04-28
Payer: COMMERCIAL

## 2025-04-28 NOTE — TELEPHONE ENCOUNTER
Received rept from [employer] with no transmission on cardiac monitor, spoke with pt and he stated that he wore the monitor for 1 week and then took it off and mailed it back

## 2025-05-02 ENCOUNTER — TELEPHONE (OUTPATIENT)
Dept: CARDIOLOGY | Facility: CLINIC | Age: 25
End: 2025-05-02
Payer: COMMERCIAL

## 2025-05-02 NOTE — TELEPHONE ENCOUNTER
Received cardiac clearance request from DR EUNICE PERSAUD stating pt has ROBOTIC LAP KIM W/ICG scheduled for 05/13/2025 and is requiring a cardiac clearance. Placed cardiac clearance request in JORJE'S inbox to review and address with provider.

## 2025-05-05 ENCOUNTER — TELEPHONE (OUTPATIENT)
Dept: CARDIOLOGY | Facility: CLINIC | Age: 25
End: 2025-05-05
Payer: COMMERCIAL

## 2025-05-05 NOTE — TELEPHONE ENCOUNTER
Blanca from [facility] returned Keira's call and LVM on the triage line to call her back [phone].

## 2025-05-05 NOTE — TELEPHONE ENCOUNTER
Caller: Dagoberto Henley     Relationship:    Best call back number: [phone]    What is your medical concern? LOW HEART RATE (40S) SKIPPING BEATS    How long has this issue been going on?  LOW HEART RATE AND SKIPPING BEATS FOR BOUT WWEEK    Is your provider already aware of this issue? NO    Have you been treated for this issue? NO

## 2025-05-05 NOTE — TELEPHONE ENCOUNTER
Per chart review, Addie ordered a 30-day monitor when pt was seen on 4/16/25. Per telephone encounter from 4/28:   Ismael Franco RegSched Rep  DF    4/28/25  1:42 PM  Note     Received rept from [facility] with no transmission on cardiac monitor, spoke with pt and he stated that he wore the monitor for 1 week and then took it off and mailed it back             Called pt, I asked why he took monitor off 3 weeks early. He stated he was told to by PCP. I stated that his PCP ordered the first device and that nothing was seen as it wasn't on long enough. I explained we ordered device for 30 days to obtain info for us and that was taken off three weeks too soon.   Pt states he was confused as he was told nothing was seen an to send device back. I reiterated that nothing was seen on the first device, which is why the second one was ordered. I explained that if he has an issue or question w/ something we order, we need him to contact us, not PCP. Explained that he needed to keep that device on for the whole 30 days.   Pt asked about his surgery. I explained that this complicates the possibility of a clearance. Stated I would have to speak w/ Addie and that I couldn't advise on his clearance at this time.          Called PCP's office, spoke w/ her clinical staff and they stated they can't imagine she told pt to take off our monitor device. Stated they would ask her about this and call us back.

## 2025-05-06 NOTE — TELEPHONE ENCOUNTER
Have them pull what data we have from the monitor.  From the testing we have available to us there is no reason we can not clear him unless the monitor shows something concerning.  He needs to keep appt as scheduled in our office.

## 2025-05-07 NOTE — TELEPHONE ENCOUNTER
In reviewing the preliminary report I do not see a reason he can not proceed with surgery.  We can clear him at acceptable risk with close hemodynamic monitoring.  He can follow up with JR as scheduled after surgery and they can decide if another monitor needs to be placed.

## 2025-05-07 NOTE — TELEPHONE ENCOUNTER
First attempt to reach pt. Left a voicemail for pt to return my call at [phone].       MIRTA Real cleared pt for surgery and we sent a clearance letter for the surgeon. At pt's F/U w/ JR, he can see if a new monitor is needed.

## 2025-05-07 NOTE — TELEPHONE ENCOUNTER
Name: AmbrocioDagoberto parr      Relationship: Self      Best Callback Number: [phone]      HUB PROVIDED THE RELAY MESSAGE FROM THE OFFICE      PATIENT: VOICED UNDERSTANDING AND HAS NO FURTHER QUESTIONS AT THIS TIME    ADDITIONAL INFORMATION:

## 2025-05-28 ENCOUNTER — TELEPHONE (OUTPATIENT)
Dept: CARDIOLOGY | Facility: CLINIC | Age: 25
End: 2025-05-28

## 2025-05-28 NOTE — TELEPHONE ENCOUNTER
Caller: Dagoberto Henley    Relationship to patient: Self    Best call back number: [phone]    Chief complaint: PATIENT STATES THAT HE IS HAVING TROUBLE BREATHING REGARDLESS OF WHETHER HE IS EXERTING HIMSELF OR NOT. HAS BEEN GOING ON FOR 2-3 MONTHS. HAS APPOINTMENT SCHEDULED WITH RASHIDA SOTO ON 6.11.25 BUT IS REQUESTING EARLIER APPOINTMENT IF POSSIBLE. PLEASE CALL PATIENT TO ADVISE.    Type of visit: FOLLOW UP    Requested date: FIRST AVAILABLE    If rescheduling, when is the original appointment: 6.11.25

## 2025-05-29 ENCOUNTER — OFFICE VISIT (OUTPATIENT)
Dept: CARDIOLOGY | Facility: CLINIC | Age: 25
End: 2025-05-29
Payer: COMMERCIAL

## 2025-05-29 VITALS
HEART RATE: 69 BPM | SYSTOLIC BLOOD PRESSURE: 145 MMHG | HEIGHT: 65 IN | DIASTOLIC BLOOD PRESSURE: 94 MMHG | BODY MASS INDEX: 19.29 KG/M2 | OXYGEN SATURATION: 98 % | WEIGHT: 115.8 LBS

## 2025-05-29 DIAGNOSIS — R07.2 PRECORDIAL PAIN: ICD-10-CM

## 2025-05-29 DIAGNOSIS — I10 PRIMARY HYPERTENSION: ICD-10-CM

## 2025-05-29 DIAGNOSIS — R07.89 CHEST PAIN, ATYPICAL: ICD-10-CM

## 2025-05-29 DIAGNOSIS — R00.2 PALPITATIONS: Primary | ICD-10-CM

## 2025-05-29 DIAGNOSIS — R06.02 SHORTNESS OF BREATH: ICD-10-CM

## 2025-05-29 PROCEDURE — 99214 OFFICE O/P EST MOD 30 MIN: CPT | Performed by: NURSE PRACTITIONER

## 2025-05-29 PROCEDURE — 3080F DIAST BP >= 90 MM HG: CPT | Performed by: NURSE PRACTITIONER

## 2025-05-29 PROCEDURE — 93000 ELECTROCARDIOGRAM COMPLETE: CPT | Performed by: NURSE PRACTITIONER

## 2025-05-29 PROCEDURE — 1160F RVW MEDS BY RX/DR IN RCRD: CPT | Performed by: NURSE PRACTITIONER

## 2025-05-29 PROCEDURE — 3077F SYST BP >= 140 MM HG: CPT | Performed by: NURSE PRACTITIONER

## 2025-05-29 PROCEDURE — 1159F MED LIST DOCD IN RCRD: CPT | Performed by: NURSE PRACTITIONER

## 2025-05-29 RX ORDER — LOSARTAN POTASSIUM 25 MG/1
12.5 TABLET ORAL DAILY
Qty: 15 TABLET | Refills: 6 | Status: SHIPPED | OUTPATIENT
Start: 2025-05-29

## 2025-05-29 NOTE — PROGRESS NOTES
Subjective     Dagoberto Henley is a 25 y.o. male who presents to day for Shortness of Breath (With rest and activity is gasping at times ), Cardiac clearance, Rapid Heart Rate (Mainly at night ), Slow Heart Rate (During the day ), and Dizziness (Random).    CHIEF COMPLIANT  Chief Complaint   Patient presents with    Shortness of Breath     With rest and activity is gasping at times     Cardiac clearance    Rapid Heart Rate     Mainly at night     Slow Heart Rate     During the day     Dizziness     Random       Active Problems:  Problem List Items Addressed This Visit          Cardiac and Vasculature    Palpitations - Primary    Relevant Orders    Cardiac Event Monitor (KULWINDER) or Mobile Cardiac Outpatient Telemetry (MCT)    Primary hypertension    Relevant Medications    losartan (Cozaar) 25 MG tablet    Other Relevant Orders    Cardiac Event Monitor (KULWINDER) or Mobile Cardiac Outpatient Telemetry (MCT)       Pulmonary and Pneumonias    Shortness of breath    Relevant Orders    Cardiac Event Monitor (KULWINDER) or Mobile Cardiac Outpatient Telemetry (MCT)    Ambulatory Referral to Pulmonology     Other Visit Diagnoses         Precordial pain        Relevant Orders    Cardiac Event Monitor (KULWINDER) or Mobile Cardiac Outpatient Telemetry (MCT)      Chest pain, atypical        Relevant Orders    Cardiac Event Monitor (KULWINDER) or Mobile Cardiac Outpatient Telemetry (MCT)        1.  Chest pain  1.1 Treadmill stress 2/18/25: At peak exercise there is 1 mm for subtle to downsloping ST segment depression in lead III with a lesser degree of depression in the other inferior leads as well as lateral precordial leads. ST segment depression was no longer significant at 1 minute into recovery. There was a single PVC during exercise. There were moderately frequent PVCs in recovery including intercalated beats. There was no sustained ectopy or block recorded.   1.2 coronary CTA 3/29/2025: No evidence PTE, no aortic dissection, high-grade stenosis in  LAD appears to be artifactual, total calcium score 0, discontinuity of RCA appears to be artifactual  2.  Shortness of breath  2.1 Echocardiogram 2/18/25: ejection fraction is 55 to 60%. Normal LV diastolic function and filling pressures. Valves are morphologically and physiologically normal.   3.  Palpitations  4.  Hypertension    HPI  HPI  Mr. Dagoberto Henley is a 25-year-old male patient who is being followed up today for chronic arterial hypertension.    Patient does have a history of chronic arterial hypertension in which she is being treated with losartan, metoprolol and as needed clonidine.  Today's blood pressure is 145/94 heart rate of 69.  He reports that his blood pressure is up-and-down.  Usually drops at night.    Patient does report chest pain that has some proved quite significantly.  He says it is very rare is more of an achy type pain that occurs in his left chest.  He says approximately 1 time a week lasting 5 to 10 minutes.  He does have associated shortness of breath.    Patient does report shortness of breath that occurs with activity.  He says he believes his shortness of breath is continuing to get worse.  He says at times he will even have to gasp for air.  He does have dyspnea at rest as well as orthopnea and PND.    Patient does have palpitations where he has intermittent racing like sensation that occurs in his chest mainly at night.  It is heart rate decreases during the day.  At night it tends to race and it will pause and skip.  He says he can pause for up to 2 seconds.  We did do a recent monitor and which was not completed and was sent back under 2 days.  I would like to reorder this for further evaluation of his palpitations and pausing.    Patient does report dizziness that occurs at random as well as fatigue where he is tired all the time.    Did review patient's labs from 4 9/25 triglycerides 150, HDL 37 and LDL 65.  He had a normal CBC CMP D-dimer A1c.  TSH was mildly elevated at  5.93.  PRIOR MEDS  Current Outpatient Medications on File Prior to Visit   Medication Sig Dispense Refill    polyethylene glycol (MIRALAX) 17 GM/SCOOP powder Take 255 g Miralax mixed with 32 oz clear liquid at 12pm night before procedure then repeat 6 hours later. (Patient taking differently: PRN) 510 g 0    ranolazine (Ranexa) 500 MG 12 hr tablet Take 1 tablet by mouth 2 (Two) Times a Day. 60 tablet 6    [DISCONTINUED] losartan (Cozaar) 25 MG tablet Take 0.5 tablets by mouth Daily. 15 tablet 6    [DISCONTINUED] cloNIDine (CATAPRES) 0.1 MG tablet Take 1 tablet by mouth 2 (Two) Times a Day As Needed for High Blood Pressure (if blood pressure is greater than 160/90).      [DISCONTINUED] metoprolol tartrate (LOPRESSOR) 50 MG tablet Take 1 tablet by mouth 1 (One) Time for 1 dose. Take 50 mg One (1) Hour Prior to Coronary CTA 1 tablet 0    [DISCONTINUED] NON FORMULARY States he was given an rx for appetite, unsure of name, states he has not started the medicine yet.      [DISCONTINUED] vitamin D (ERGOCALCIFEROL) 1.25 MG (89037 UT) capsule capsule TAKE 1 CAPSULE BY MOUTH ONCE A WEEK ON THE SAME DAY EVERY WEEK       No current facility-administered medications on file prior to visit.       ALLERGIES  Amoxicillin, Benadryl [diphenhydramine], Penicillins, and Nitrofuran derivatives    HISTORY  Past Medical History:   Diagnosis Date    Anxiety and depression     Hypertension     Stomach disease        Social History     Socioeconomic History    Marital status: Single   Tobacco Use    Smoking status: Never    Smokeless tobacco: Never   Vaping Use    Vaping status: Never Used   Substance and Sexual Activity    Alcohol use: Never    Drug use: Never    Sexual activity: Defer       Family History   Problem Relation Age of Onset    Kidney failure Mother     COPD Mother     Heart failure Mother     No Known Problems Other        Review of Systems   Constitutional:  Positive for fatigue. Negative for chills and fever.   HENT:   "Negative for congestion, rhinorrhea and sore throat.    Eyes:  Negative for visual disturbance.   Respiratory:  Positive for shortness of breath (with rest or activity is gasping to breath). Negative for apnea and chest tightness.    Cardiovascular:  Positive for chest pain (achy pain on left side of chest  no radiation) and palpitations (racing at night low heart rate during the day). Negative for leg swelling.   Gastrointestinal:  Positive for diarrhea. Negative for constipation and nausea.   Musculoskeletal:  Positive for arthralgias (right wrist) and back pain. Negative for neck pain.   Skin:  Negative for rash and wound.   Allergic/Immunologic: Positive for environmental allergies. Negative for food allergies.   Neurological:  Positive for dizziness (with any actviity or sitting up), weakness and light-headedness. Negative for syncope.   Hematological:  Bruises/bleeds easily.   Psychiatric/Behavioral:  Negative for sleep disturbance.        Objective     VITALS: /94 (BP Location: Right arm, Patient Position: Sitting, Cuff Size: Adult)   Pulse 69   Ht 165.1 cm (65\")   Wt 52.5 kg (115 lb 12.8 oz)   SpO2 98%   BMI 19.27 kg/m²     LABS:   Lab Results (most recent)       None            IMAGING:   CT Angiogram Coronary  Result Date: 3/30/2025  1.  No evidence of a pulmonary embolus. 2.  No aortic dissection. 3.  High-grade stenosis in the LAD also appears to be artifactual. 4.  Total calcium score 0. 5.  Tricuspid aortic valve without calcification. 6.  There is discontinuity of the RCA appears to be artifactual.  ASSESSMENT: CAD RADS N/P0  This report was finalized on 3/30/2025 8:47 AM by Dr. Domingo Templeton MD.      CT Abdomen Pelvis Stone Protocol  Result Date: 2/7/2025  Significant rectal fecal impaction. Rectal wall thickening.  This report was finalized on 2/7/2025 6:13 PM by Alex Pallas, DO.        EXAM:  Physical Exam  Vitals and nursing note reviewed.   Constitutional:       Appearance: He is " well-developed.   HENT:      Head: Normocephalic.   Neck:      Thyroid: No thyroid mass.      Vascular: No carotid bruit or JVD.      Trachea: Trachea and phonation normal.   Cardiovascular:      Rate and Rhythm: Normal rate and regular rhythm.      Pulses:           Radial pulses are 2+ on the right side and 2+ on the left side.        Posterior tibial pulses are 2+ on the right side and 2+ on the left side.      Heart sounds: Normal heart sounds. No murmur heard.     No friction rub. No gallop.   Pulmonary:      Effort: Pulmonary effort is normal. No respiratory distress.      Breath sounds: Normal breath sounds. No wheezing or rales.   Musculoskeletal:         General: No swelling. Normal range of motion.      Cervical back: Neck supple.   Skin:     General: Skin is warm and dry.      Capillary Refill: Capillary refill takes less than 2 seconds.      Findings: No rash.   Neurological:      Mental Status: He is alert and oriented to person, place, and time.   Psychiatric:         Speech: Speech normal.         Behavior: Behavior normal.         Thought Content: Thought content normal.         Judgment: Judgment normal.         Procedure     ECG 12 Lead    Date/Time: 5/29/2025 1:57 PM  Performed by: Kingston James APRN    Authorized by: Kingston James APRN  Comparison: compared with previous ECG from 4/7/2025  Comparison to previous ECG: ST changes lead V4 through V6 leads III through a VF  Increasing voltage  Rhythm: sinus rhythm  Rate: normal  BPM: 77  QRS axis: right  Other findings: non-specific ST-T wave changes and left ventricular hypertrophy with strain  Comments: QTc 400 ms  No acute changes             Assessment & Plan    Diagnosis Plan   1. Palpitations  Cardiac Event Monitor (KULWINDER) or Mobile Cardiac Outpatient Telemetry (MCT)      2. Shortness of breath  Cardiac Event Monitor (KULWINDER) or Mobile Cardiac Outpatient Telemetry (MCT)    Ambulatory Referral to Pulmonology      3. Precordial pain  Cardiac  Event Monitor (KULWINDER) or Mobile Cardiac Outpatient Telemetry (MCT)      4. Primary hypertension  Cardiac Event Monitor (KULWINDER) or Mobile Cardiac Outpatient Telemetry (MCT)    losartan (Cozaar) 25 MG tablet      5. Chest pain, atypical  Cardiac Event Monitor (KULWINDER) or Mobile Cardiac Outpatient Telemetry (MCT)      1.  Patient continues to have significant shortness of breath actually progressing per his report despite relatively normal cardiac workup.  Therefore I like for him to be evaluated for pulmonology for further evaluation of potential causes of his shortness of breath from their standpoint.  2.  Patient's blood pressure is up-and-down we did discuss increasing his lisinopril but did not feel comfortable with doing so due to the fact it drops at night.  He will continue to monitor his blood pressure report any significant highs or lows at home.  Goal blood pressures 130s over 80s was discussed  3.  If pulmonary workup does not explain patient's symptoms we will consider sending patient for left heart catheterization or if symptoms worsen.  4.  Since patient was unable to complete his monitor I would like for him to repeat the monitor for 30 days to determine the etiology of his palpitations and significant symptomology.  5.  Informed of signs and symptoms of ACS and advised to seek emergent treatment for any new worsening symptoms.  Patient also advised sooner follow-up as needed.  Also advised to follow-up with family doctor as needed  This note is dictated utilizing voice recognition software.  Although this record has been proof read, transcriptional errors may still be present. If questions occur regarding the content of this record please do not hesitate to call our office.  I have reviewed and confirmed the accuracy of the ROS as documented by the MA/LPN/RN SENTHIL Kirby    Return if symptoms worsen or fail to improve, for Next scheduled follow up.    Diagnoses and all orders for this visit:    1.  Palpitations (Primary)  -     Cardiac Event Monitor (KULWINDER) or Mobile Cardiac Outpatient Telemetry (MCT); Future    2. Shortness of breath  -     Cardiac Event Monitor (KULWINDER) or Mobile Cardiac Outpatient Telemetry (MCT); Future  -     Ambulatory Referral to Pulmonology    3. Precordial pain  -     Cardiac Event Monitor (KULWINDER) or Mobile Cardiac Outpatient Telemetry (MCT); Future    4. Primary hypertension  -     Cardiac Event Monitor (KULWINDER) or Mobile Cardiac Outpatient Telemetry (MCT); Future  -     losartan (Cozaar) 25 MG tablet; Take 0.5 tablets by mouth Daily.  Dispense: 15 tablet; Refill: 6    5. Chest pain, atypical  -     Cardiac Event Monitor (KULWINDER) or Mobile Cardiac Outpatient Telemetry (MCT); Future    Other orders  -     ECG 12 Lead        Dagoberto Henley  reports that he has never smoked. He has never used smokeless tobacco. I have educated him on the risk of diseases from using tobacco products. Patient does not smoke.              BMI is within normal parameters. No other follow-up for BMI required.           MEDS ORDERED DURING VISIT:  New Medications Ordered This Visit   Medications    losartan (Cozaar) 25 MG tablet     Sig: Take 0.5 tablets by mouth Daily.     Dispense:  15 tablet     Refill:  6           This document has been electronically signed by Kingston James Jr., APRN  May 29, 2025 17:05 EDT

## 2025-06-18 ENCOUNTER — TELEPHONE (OUTPATIENT)
Dept: CARDIOLOGY | Facility: CLINIC | Age: 25
End: 2025-06-18
Payer: COMMERCIAL

## 2025-06-18 NOTE — TELEPHONE ENCOUNTER
Caller: Dagoberto Henley    Relationship: Self    Best call back number: [phone]     What is the best time to reach you: ANY     What was the call regarding: PT HAS A LUNG DR APPT ON 8/21, ASKING THAT WE CALL THEIR OFFICE AND TRY TO GET HIM IN SOONER- STATES DIFFICULTY WITH BREATHING IS GETTING WORSE.    SOB ACTIVE NOW   YEARS PER CALLER     UNABLE TO WT DUE TO ACTIVE SYMPTOMS      Is it okay if the provider responds through MyChart: BOTH

## 2025-06-19 ENCOUNTER — OFFICE VISIT (OUTPATIENT)
Dept: PULMONOLOGY | Facility: CLINIC | Age: 25
End: 2025-06-19
Payer: COMMERCIAL

## 2025-06-19 ENCOUNTER — TELEPHONE (OUTPATIENT)
Dept: PULMONOLOGY | Facility: CLINIC | Age: 25
End: 2025-06-19

## 2025-06-19 VITALS
SYSTOLIC BLOOD PRESSURE: 128 MMHG | DIASTOLIC BLOOD PRESSURE: 80 MMHG | OXYGEN SATURATION: 100 % | HEART RATE: 60 BPM | WEIGHT: 113 LBS | BODY MASS INDEX: 18.83 KG/M2 | HEIGHT: 65 IN

## 2025-06-19 DIAGNOSIS — R06.02 SHORTNESS OF BREATH: Primary | ICD-10-CM

## 2025-06-19 DIAGNOSIS — J45.40 MODERATE PERSISTENT ASTHMA WITHOUT COMPLICATION: ICD-10-CM

## 2025-06-19 DIAGNOSIS — R09.02 EXERCISE HYPOXEMIA: ICD-10-CM

## 2025-06-19 NOTE — PROGRESS NOTES
"     New Pulmonary Patient Office Visit      Patient Name: Dagoberto Henley    Referring Physician: Kingston James APRN    Chief Complaint:    Chief Complaint   Patient presents with    Shortness of Breath       History of Present Illness: Dagoberto Henley is a 25 y.o. male who is referred here today by cardiology to establish care with Pulmonary for shortness of breath.  He is a poor historian and is not very clear about the timeline or severity of his symptoms.  He says that he did an office breathing test and a walk test with his PCP a couple of months ago and that the results were normal.  He denies ever being diagnosed with asthma in the past.  He says that seasonal allergies \"hit him a couple of times\".  He reports that his dyspnea actually seems worse when he lies down and improves with exertion. He reports being prescribed albuterol last year, which he uses 1x/day though he says that it does not seem to be helpful.    Duration: Shortness of breath became apparent \"years ago\" after he had flu/bronchitis  Associated Symptoms: No cough or wheezing  Modifying Factors: Worse with weather changes and exposure to strong smells or pollen  Supplemental Oxygen: No    Subjective      Review of Systems:   Review of Systems   Constitutional:  Negative for fever and unexpected weight change.   Respiratory:  Positive for shortness of breath. Negative for cough and wheezing.    Cardiovascular:  Negative for chest pain and leg swelling.   Allergic/Immunologic: Positive for environmental allergies.        Past Medical History:   Past Medical History:   Diagnosis Date    Anxiety and depression     Hypertension     Stomach disease        Past Surgical History:   Past Surgical History:   Procedure Laterality Date    MOUTH SURGERY      OTHER SURGICAL HISTORY      had dental procedure       Family History:   Family History   Problem Relation Age of Onset    Kidney failure Mother     COPD Mother     Heart failure Mother     No Known " "Problems Other        Social History:   Social History     Socioeconomic History    Marital status: Single   Tobacco Use    Smoking status: Never     Passive exposure: Past    Smokeless tobacco: Never   Vaping Use    Vaping status: Never Used   Substance and Sexual Activity    Alcohol use: Never    Drug use: Never    Sexual activity: Defer       Medications:     Current Outpatient Medications:     losartan (Cozaar) 25 MG tablet, Take 0.5 tablets by mouth Daily., Disp: 15 tablet, Rfl: 6    polyethylene glycol (MIRALAX) 17 GM/SCOOP powder, Take 255 g Miralax mixed with 32 oz clear liquid at 12pm night before procedure then repeat 6 hours later. (Patient taking differently: PRN), Disp: 510 g, Rfl: 0    ranolazine (Ranexa) 500 MG 12 hr tablet, Take 1 tablet by mouth 2 (Two) Times a Day. (Patient not taking: Reported on 6/19/2025), Disp: 60 tablet, Rfl: 6    Allergies:   Allergies   Allergen Reactions    Amoxicillin Hives    Benadryl [Diphenhydramine] Hives    Penicillins Hives    Nitrofuran Derivatives Other (See Comments)     Pt states that he had nitro for CTA and that it caused him to be numb      Objective     Physical Exam:  Vital Signs:   Vitals:    06/19/25 1220   BP: 128/80   Pulse: 60   SpO2: 100%   Weight: 51.3 kg (113 lb)   Height: 165.1 cm (65\")     Body mass index is 18.8 kg/m².  ============================  ============================    6 MINUTE WALK TEST    Dagoberto Henley   2000             BASELINE   SpO2%: 100 % RA    Heart Rate 60   Blood Pressure 128/80     EXERCISE SpO2% HEART RATE RA or O2 @ LPM   1 MINUTE 94 87 RA   2 MINUTES 93 87 RA   3 MINUTES 93 87 RA   4 MINUTES 90 83 RA   5 MINUTES 86 85 RA   6 MINUTES 98 63 @2LPM   (Number of laps: 7 X 36 meters + Final partial lap:   meters = 252 meters)            Distance Walked:  252 Meters   SpO2% Post Exercise:  100 %   HR Post Exercise:  52     Reason to stop (if applicable):   ____ Chest Pain   ____ Light Headedness   ____ Dyspnea Unrelieved by " Rest   ____ Abnormal Gait Pattern   ____ Severe Fatigue   ____ Other (Specify: __________________________)    Tech Comments (if any): NA     Test performed by: BB    ============================  ============================      Physical Exam  Vitals reviewed.   Constitutional:       General: He is not in acute distress.     Appearance: He is not toxic-appearing.      Comments: Thin   HENT:      Head: Normocephalic and atraumatic.      Mouth/Throat:      Mouth: Mucous membranes are moist.      Comments: Poor dentition  Eyes:      Extraocular Movements: Extraocular movements intact.      Conjunctiva/sclera: Conjunctivae normal.   Cardiovascular:      Rate and Rhythm: Normal rate.      Heart sounds: Normal heart sounds.   Pulmonary:      Effort: Pulmonary effort is normal.      Breath sounds: Normal breath sounds.   Abdominal:      General: There is no distension.      Palpations: Abdomen is soft.   Musculoskeletal:         General: No swelling.      Cervical back: Neck supple.   Skin:     General: Skin is warm and dry.      Findings: No rash.   Neurological:      General: No focal deficit present.      Mental Status: He is alert and oriented to person, place, and time.   Psychiatric:         Mood and Affect: Mood normal.         Behavior: Behavior normal.       Results Review:   March 2025 CTA coronary showed no evidence of pulmonary embolus.  No aortic dissection.  High-grade stenosis in the LAD appears to be artifactual.  Total calcium score of 0.  Tricuspid aortic valve without calcification. There is discontinuity of the RCA appears to be artifactual.  Lungs and pleural spaces were unremarkable.  No mediastinal mass.    FeNO today was 10 ppb.    WBC   Date Value Ref Range Status   02/07/2025 7.46 3.40 - 10.80 10*3/mm3 Final     RBC   Date Value Ref Range Status   02/07/2025 5.27 4.14 - 5.80 10*6/mm3 Final     Hemoglobin   Date Value Ref Range Status   02/07/2025 15.4 13.0 - 17.7 g/dL Final     Hematocrit   Date  Value Ref Range Status   02/07/2025 45.5 37.5 - 51.0 % Final     MCV   Date Value Ref Range Status   02/07/2025 86.3 79.0 - 97.0 fL Final     MCH   Date Value Ref Range Status   02/07/2025 29.2 26.6 - 33.0 pg Final     MCHC   Date Value Ref Range Status   02/07/2025 33.8 31.5 - 35.7 g/dL Final     RDW   Date Value Ref Range Status   02/07/2025 12.4 12.3 - 15.4 % Final     RDW-SD   Date Value Ref Range Status   02/07/2025 39.0 37.0 - 54.0 fl Final     MPV   Date Value Ref Range Status   02/07/2025 9.6 6.0 - 12.0 fL Final     Platelets   Date Value Ref Range Status   02/07/2025 233 140 - 450 10*3/mm3 Final     Neutrophil %   Date Value Ref Range Status   02/07/2025 66.6 42.7 - 76.0 % Final     Lymphocyte %   Date Value Ref Range Status   02/07/2025 22.5 19.6 - 45.3 % Final     Monocyte %   Date Value Ref Range Status   02/07/2025 6.6 5.0 - 12.0 % Final     Eosinophil %   Date Value Ref Range Status   02/07/2025 3.4 0.3 - 6.2 % Final     Basophil %   Date Value Ref Range Status   02/07/2025 0.8 0.0 - 1.5 % Final     Immature Grans %   Date Value Ref Range Status   02/07/2025 0.1 0.0 - 0.5 % Final     Neutrophils, Absolute   Date Value Ref Range Status   02/07/2025 4.97 1.70 - 7.00 10*3/mm3 Final     Lymphocytes, Absolute   Date Value Ref Range Status   02/07/2025 1.68 0.70 - 3.10 10*3/mm3 Final     Monocytes, Absolute   Date Value Ref Range Status   02/07/2025 0.49 0.10 - 0.90 10*3/mm3 Final     Eosinophils, Absolute   Date Value Ref Range Status   02/07/2025 0.25 0.00 - 0.40 10*3/mm3 Final     Basophils, Absolute   Date Value Ref Range Status   02/07/2025 0.06 0.00 - 0.20 10*3/mm3 Final     Immature Grans, Absolute   Date Value Ref Range Status   02/07/2025 0.01 0.00 - 0.05 10*3/mm3 Final     nRBC   Date Value Ref Range Status   02/07/2025 0.0 0.0 - 0.2 /100 WBC Final     Lab Results   Component Value Date    GLUCOSE 104 (H) 02/07/2025    BUN 12 02/07/2025    CREATININE 0.95 02/07/2025     02/07/2025    K 3.9  02/07/2025     02/07/2025    CALCIUM 9.5 02/07/2025    PROTEINTOT 7.8 02/07/2025    ALBUMIN 4.6 02/07/2025    ALT 10 02/07/2025    AST 17 02/07/2025    ALKPHOS 77 02/07/2025    BILITOT 0.7 02/07/2025    GLOB 3.2 02/07/2025    AGRATIO 1.4 02/07/2025    BCR 12.6 02/07/2025    ANIONGAP 10.4 02/07/2025    EGFR 116.1 02/07/2025     Results for orders placed during the hospital encounter of 02/18/25    Adult Transthoracic Echo Complete W/ Cont if Necessary Per Protocol    Interpretation Summary  Physician interpretation.  Very good to excellent technical quality.    1.  LV size, function, wall motion, and wall thickness are normal.  Visually estimated ejection fraction is 55 to 60%.  Normal LV diastolic function and filling pressures.  An apparent cord in the more apical portions of the left ventricle is an incidental finding only.  The atria and right ventricle are normal.  No septal defect or intracavitary mass or thrombus.    2.  Valves are morphologically and physiologically normal.    3.  No pericardial or great vessel pathology.    4.  Right heart pressures cannot be calculated.  There is no findings to suggest significantly increased right heart pressures.    Summary: Normal study.    Assessment / Plan      Assessment/Plan:    Diagnoses and all orders for this visit:    1. Shortness of breath (Primary)  -     POCT FENO Test  -     6 Minute Walk Test; Future  -     Complete PFT - Pre & Post Bronchodilator; Future  Unclear etiology. Continues to follow with cardiology. Symptoms may be due to underlying asthma and will therefore begin empiric treatment as noted below. Further assess with full PFTs.    2. Moderate persistent asthma without complication  -     Fluticasone-Umeclidin-Vilant (TRELEGY ELLIPTA) 200-62.5-25 MCG/ACT inhaler; Inhale 1 puff Daily for 30 days. Rinse mouth out after use  Dispense: 1 each; Refill: 2  -     Oxygen Therapy  Begin use of Trelegy. We discussed the risk and benefits of inhaled  corticosteroids. Patient instructed to take them on a regular basis as prescribed. Patient instructed to rinse their mouth out after each use. Appropriate inhaler technique demonstrated today in clinic. Patient instructed to contact their insurance company and make sure that the medications prescribed are on their formulary and the lowest cost/tier for them. They will call the clinic back if different medications need to prescribed.     3. Exercise hypoxemia  -     Oxygen Therapy  Walk test performed today revealed the need for supplemental O2 during exertion. Rx written for portable oxygen concentrator for improved mobility. Will plan for repeat walk test at next clinic visit.       Follow Up:   Return in about 3 months (around 9/19/2025) for Recheck, Follow up after testing complete.  The patient was counseled on diagnostic results, risks and benefits of treatment options, risk factor modifications and the importance of treatment compliance. The patient was advised to contact the clinic with concerns or worsening symptoms.     SENTHIL Fair  [facility] [place]    This document has been electronically signed by SENTHIL Fair  June 19, 2025

## 2025-06-19 NOTE — TELEPHONE ENCOUNTER
Provider: SUSANA MIRANDA    Caller: Dagoberto Henley    Relationship to Patient: Self    Reason for Call: PATIENT WOULD LIKE TO SPEAK WITH CLINICAL ABOUT HIS INHALER PRESCRIPTION

## 2025-06-20 ENCOUNTER — PATIENT ROUNDING (BHMG ONLY) (OUTPATIENT)
Dept: PULMONOLOGY | Facility: CLINIC | Age: 25
End: 2025-06-20
Payer: COMMERCIAL

## 2025-06-20 LAB — EXHALED NITROUS OXIDE: 10

## 2025-06-24 ENCOUNTER — TELEPHONE (OUTPATIENT)
Dept: PULMONOLOGY | Facility: CLINIC | Age: 25
End: 2025-06-24
Payer: COMMERCIAL

## 2025-06-24 NOTE — TELEPHONE ENCOUNTER
Caller: Dagoberto Henley    Relationship: Self    Best call back number: [phone]     What is the best time to reach you: ANY    Who are you requesting to speak with (clinical staff, provider,  specific staff member): MAYO OLIVARES    Do you know the name of the person who called: MAYO OLIVARES    What was the call regarding: UNKNOWN

## 2025-06-26 ENCOUNTER — TELEPHONE (OUTPATIENT)
Dept: CARDIOLOGY | Facility: CLINIC | Age: 25
End: 2025-06-26
Payer: COMMERCIAL

## 2025-06-26 NOTE — TELEPHONE ENCOUNTER
Caller: Dagoberto Henley    Relationship: Self    Best call back number: [phone]    What is the best time to reach you: ANY    Who are you requesting to speak with (clinical staff, provider,  specific staff member): CLINICAL    What was the call regarding: PT SAID HEART MONITOR STOPPED WORKING 6.25.25. PLEASE CALL PT WHEN AVAILABLE TO DISCUSS.

## 2025-07-02 DIAGNOSIS — R06.02 SHORTNESS OF BREATH: ICD-10-CM

## 2025-07-07 ENCOUNTER — TELEPHONE (OUTPATIENT)
Dept: PULMONOLOGY | Facility: CLINIC | Age: 25
End: 2025-07-07
Payer: COMMERCIAL

## 2025-07-07 NOTE — TELEPHONE ENCOUNTER
Caller: Dagoberto Henley    Relationship: Self    Best call back number: [phone]     Caller requesting test results: SELF     What test was performed: PFT     When was the test performed: 7/2/2025    Where was the test performed: [facility]     Additional notes: WOULD LIKE SOMEONE TO CALL TO GO OVER RESULTS

## 2025-07-08 NOTE — TELEPHONE ENCOUNTER
Caller: Dagoberto Henley    Relationship to patient: Self    Best call back number: [phone]    Patient is needing: PATIENT CALLING TO SEE IF HIS TEST RESULTS ARE BACK AND IF THE DR CAN GO OVER IT WITH HIM.  ROUTING TO CORRECT POOL AS WELL.

## 2025-07-08 NOTE — TELEPHONE ENCOUNTER
Patient informed that the PFT was received today, but has not been reviewed by SENTHIL [place].  Patient states understanding.

## 2025-07-09 ENCOUNTER — TELEPHONE (OUTPATIENT)
Dept: PULMONOLOGY | Facility: CLINIC | Age: 25
End: 2025-07-09
Payer: COMMERCIAL

## 2025-07-09 NOTE — TELEPHONE ENCOUNTER
Patient wanted to know results of PFT.  Informed patient that we would call him once it is reviewed.  Patient states understanding.

## 2025-07-10 ENCOUNTER — TELEPHONE (OUTPATIENT)
Dept: CARDIOLOGY | Facility: CLINIC | Age: 25
End: 2025-07-10
Payer: COMMERCIAL

## 2025-07-11 NOTE — TELEPHONE ENCOUNTER
Based on the testing in which we have performed I do not see anything that would eliminate the patient from joining the [employer]

## 2025-07-17 ENCOUNTER — OFFICE VISIT (OUTPATIENT)
Dept: PULMONOLOGY | Facility: CLINIC | Age: 25
End: 2025-07-17
Payer: COMMERCIAL

## 2025-07-17 ENCOUNTER — TELEPHONE (OUTPATIENT)
Dept: CARDIOLOGY | Facility: CLINIC | Age: 25
End: 2025-07-17
Payer: COMMERCIAL

## 2025-07-17 VITALS
HEART RATE: 72 BPM | OXYGEN SATURATION: 98 % | WEIGHT: 116 LBS | DIASTOLIC BLOOD PRESSURE: 80 MMHG | BODY MASS INDEX: 19.33 KG/M2 | HEIGHT: 65 IN | SYSTOLIC BLOOD PRESSURE: 124 MMHG

## 2025-07-17 DIAGNOSIS — G47.19 DAYTIME HYPERSOMNOLENCE: ICD-10-CM

## 2025-07-17 DIAGNOSIS — R06.02 SHORTNESS OF BREATH: Primary | ICD-10-CM

## 2025-07-17 DIAGNOSIS — J45.40 MODERATE PERSISTENT ASTHMA WITHOUT COMPLICATION: ICD-10-CM

## 2025-07-17 PROCEDURE — 3074F SYST BP LT 130 MM HG: CPT | Performed by: NURSE PRACTITIONER

## 2025-07-17 PROCEDURE — 3079F DIAST BP 80-89 MM HG: CPT | Performed by: NURSE PRACTITIONER

## 2025-07-17 PROCEDURE — 99214 OFFICE O/P EST MOD 30 MIN: CPT | Performed by: NURSE PRACTITIONER

## 2025-07-17 PROCEDURE — 94618 PULMONARY STRESS TESTING: CPT | Performed by: NURSE PRACTITIONER

## 2025-07-17 RX ORDER — ERGOCALCIFEROL 1.25 MG/1
CAPSULE, LIQUID FILLED ORAL
COMMUNITY
Start: 2025-07-01 | End: 2025-07-23

## 2025-07-17 RX ORDER — MULTIVITAMIN WITH IRON
1 TABLET ORAL DAILY
COMMUNITY
Start: 2025-07-01 | End: 2025-07-23

## 2025-07-17 NOTE — TELEPHONE ENCOUNTER
I sent [employer] message to patient letting him know that JR  would order additional testing once he sees him if he feels it is necessary.

## 2025-07-17 NOTE — TELEPHONE ENCOUNTER
Caller: Dagoberto Henley    Relationship: Self    Best call back number: [phone]    What is the best time to reach you: ANY    Who are you requesting to speak with (clinical staff, provider,  specific staff member): CLINICAL    What was the call regarding: PT SAW HIS PCP ON FILE YESTERDAY AND SHE STATED THAT SHE TALKED TO RASHIDA ABOUT GETTING MORE TEST DONE AND THEY BOTH AGREED ON THAT. THERE IS NOTHING IN PTS CHART SHOWING ANYTHING ABOUT A NEW TEST OR ANY CONVERSATION THAT TOOK PLACE. PLEASE REACH OUT TO PT

## 2025-07-17 NOTE — PROGRESS NOTES
"     Follow Up Office Visit      Patient Name: Dagoberto Henley    Chief Complaint:    Chief Complaint   Patient presents with    Shortness of Breath       History of Present Illness: Dagoberto Henley is a 25 y.o. male who is here today for follow up of shortness of breath. He is a poor historian. Since last visit, he has been using supplemental O2 occasionally, though he says that use gives him a headache. He says that he does not think that his shortness of breath is pulmonary related but mentions that a slow heartbeat and pauses may be contributing based on a Holter monitor. He continues following with cardiology. His dyspnea varies and he says that is is most apparent when he is \"sitting there playing a game or watching TikTok\" and is worse when he lies down. He has not been using Trelegy as prescribed previously because he says that he didn't think that he could use it along with his oxygen. He has not used his albuterol \"in a long time\", though reported using it once daily at clinic visit last month. He says that albuterol has not seemed beneficial when used in the past. He does note daytime fatigue and sometimes awakens at night feeling short of breath. No snoring, no cough, no wheezing.    Duration: \"years\"  Severity: \"in between\"  Timing: Intermittent  Context: Varies  Supplemental Oxygen: 2L PRN    Subjective      Review of Systems:  Review of Systems   Constitutional:  Positive for fatigue. Negative for fever and unexpected weight change.   Respiratory:  Positive for shortness of breath. Negative for cough and wheezing.    Cardiovascular:  Negative for chest pain and leg swelling.        Past Medical History:   Past Medical History:   Diagnosis Date    Anxiety and depression     Hypertension     Stomach disease        Past Surgical History:   Past Surgical History:   Procedure Laterality Date    MOUTH SURGERY      OTHER SURGICAL HISTORY      had dental procedure       Family History:   Family History   Problem " "Relation Age of Onset    Kidney failure Mother     COPD Mother     Heart failure Mother     No Known Problems Other        Social History:   Social History     Socioeconomic History    Marital status: Single   Tobacco Use    Smoking status: Never     Passive exposure: Past    Smokeless tobacco: Never   Vaping Use    Vaping status: Never Used   Substance and Sexual Activity    Alcohol use: Never    Drug use: Never    Sexual activity: Defer       Current Medications:     Current Outpatient Medications:     Fluticasone-Umeclidin-Vilant (TRELEGY ELLIPTA) 200-62.5-25 MCG/ACT inhaler, Inhale 1 puff Daily for 30 days. Rinse mouth out after use, Disp: 1 each, Rfl: 2 (Not currently taking)    losartan (Cozaar) 25 MG tablet, Take 0.5 tablets by mouth Daily., Disp: 15 tablet, Rfl: 6    polyethylene glycol (MIRALAX) 17 GM/SCOOP powder, Take 255 g Miralax mixed with 32 oz clear liquid at 12pm night before procedure then repeat 6 hours later. (Patient taking differently: PRN), Disp: 510 g, Rfl: 0    vitamin B-12 (CYANOCOBALAMIN) 500 MCG tablet, Take 1 tablet by mouth Daily. (Patient taking differently: Take 1 tablet by mouth Daily. Not started taking yet), Disp: , Rfl:     vitamin D (ERGOCALCIFEROL) 1.25 MG (23016 UT) capsule capsule, TAKE 1 CAPSULE BY MOUTH ONCE A WEEK ON THE SAME DAY EVERY WEEK (Patient taking differently: Not started taking yet), Disp: , Rfl:      Allergies:   Allergies   Allergen Reactions    Amoxicillin Hives    Benadryl [Diphenhydramine] Hives    Penicillins Hives    Nitrofuran Derivatives Other (See Comments)     Pt states that he had nitro for CTA and that it caused him to be numb      Objective     Physical Exam:  Vital Signs:   Vitals:    07/17/25 1103   BP: 124/80   Pulse: 72   SpO2: 98%   Weight: 52.6 kg (116 lb)   Height: 165.1 cm (65\")     Body mass index is 19.3 kg/m².  ============================  ============================    6 MINUTE WALK TEST    Dagoberto Henley   2000             BASELINE "   SpO2%: 98 % RA    Heart Rate 72   Blood Pressure 124/80     EXERCISE SpO2% HEART RATE RA or O2 @ LPM   1 MINUTE 98 76 RA   2 MINUTES 98 72 RA   3 MINUTES 98 72 RA   4 MINUTES 91 74 RA   5 MINUTES 92 78 RA   6 MINUTES 93 70 RA   (Number of laps: 8 X 36 meters + Final partial lap: 0 meters = 288 meters)            Distance Walked:  288 Meters   SpO2% Post Exercise:  97 %   HR Post Exercise:  78     Reason to stop (if applicable):   ____ Chest Pain   ____ Light Headedness   ____ Dyspnea Unrelieved by Rest   ____ Abnormal Gait Pattern   ____ Severe Fatigue   ____ Other (Specify: __________________________)    Tech Comments (if any): NA     Test performed by: BB    ============================  ============================      Physical Exam  Vitals reviewed.   Constitutional:       General: He is not in acute distress.     Appearance: He is not toxic-appearing.   HENT:      Head: Normocephalic and atraumatic.      Mouth/Throat:      Mouth: Mucous membranes are moist.   Eyes:      Extraocular Movements: Extraocular movements intact.      Conjunctiva/sclera: Conjunctivae normal.   Cardiovascular:      Rate and Rhythm: Normal rate and regular rhythm.   Pulmonary:      Effort: Pulmonary effort is normal.      Breath sounds: Normal breath sounds.   Abdominal:      General: There is no distension.      Palpations: Abdomen is soft.   Musculoskeletal:         General: No swelling.      Cervical back: Neck supple.   Skin:     General: Skin is warm and dry.      Findings: No rash.   Neurological:      General: No focal deficit present.      Mental Status: He is alert and oriented to person, place, and time.   Psychiatric:         Mood and Affect: Mood normal.         Behavior: Behavior normal.       Results Review:   July 2025 PFTs showed normal spirometry and lung volumes without bronchodilator responsiveness.  Prebronchodilator FEV1 of 103%, postbronchodilator FEV1 of 96%. Normal diffusion.    Assessment / Plan       Assessment/Plan:   Diagnoses and all orders for this visit:    1. Shortness of breath (Primary)  -     6 Minute Walk Test; Future  Etiology remains unclear as workup to date has been unrevealing. Recent PFT results reviewed and discussed with patient, results returned normal. No need for supplemental O2 during walk test performed today. It is difficult to ascertain what seems to be causing symptoms as he is a poor historian and reported symptoms seem contradictory. He should continue following with cardiology and continue to work with his PCP for symptoms work up.    2. Daytime hypersomnolence  -     Home Sleep Study; Future  Patient with multiple symptoms suggestive of sleep apnea. Will check sleep study to evaluate. He is advised to avoid sleepy driving.    3. Moderate persistent asthma without complication  Unclear if this is truly causing his symptoms, particularly in light of his normal PFTs and FeNO result, though it is still recommended that he trial empiric treatment with Trelegy as it is necessary to determine if his symptoms improve with use. If no change in symptoms with use of Trelegy, then would advise continuing to pursue non-pulmonary cause of his symptoms. We discussed the risk and benefits of inhaled corticosteroids. Patient instructed to take them on a regular basis as prescribed. Patient instructed to rinse their mouth out after each use.        Follow Up:   Return in about 3 months (around 10/17/2025) for Recheck.  The patient was counseled on diagnostic results, risks and benefits of treatment options, risk factor modifications and the importance of treatment compliance. The patient was advised to contact the clinic with concerns or worsening symptoms.     SENTHIL Fair   [facility] [place]     This document has been electronically signed by SENTHIL Fair  July 17, 2025

## 2025-07-21 ENCOUNTER — TELEPHONE (OUTPATIENT)
Dept: CARDIOLOGY | Facility: CLINIC | Age: 25
End: 2025-07-21
Payer: COMMERCIAL

## 2025-07-21 NOTE — TELEPHONE ENCOUNTER
Patient has been having chest pain that comes and goes . He says he can not do much . Says he feels fine other than chest pain. He has not checked his blood pressure lately . It was high previously when he was playing video games at his moms. This was before his last visit . He would like sooner follow up to discuss having testing.  Patient had Stress test and Echo in Feb. He wore a holter in April and July. PCP had previously messaged about this patient as well.

## 2025-07-21 NOTE — TELEPHONE ENCOUNTER
Caller: Dagoberto Henley    Relationship to patient: Self    Best call back number: [phone]    Chief complaint:     Type of visit: FOLLOW UP    Requested date: ASAP     If rescheduling, when is the original appointment: 10.30.2025     Additional notes:PATIENT ADVISING HE HAS BEEN HAVING CHEST PAINS OFF AND ON-NOT CURRENTLY. PLEASE CALL THE PATIENT TO RES.

## 2025-07-23 ENCOUNTER — OFFICE VISIT (OUTPATIENT)
Dept: CARDIOLOGY | Facility: CLINIC | Age: 25
End: 2025-07-23
Payer: COMMERCIAL

## 2025-07-23 VITALS
DIASTOLIC BLOOD PRESSURE: 84 MMHG | WEIGHT: 112.4 LBS | HEART RATE: 51 BPM | HEIGHT: 65 IN | BODY MASS INDEX: 18.73 KG/M2 | SYSTOLIC BLOOD PRESSURE: 136 MMHG | OXYGEN SATURATION: 100 %

## 2025-07-23 DIAGNOSIS — R07.2 PRECORDIAL PAIN: ICD-10-CM

## 2025-07-23 DIAGNOSIS — R00.2 PALPITATIONS: ICD-10-CM

## 2025-07-23 DIAGNOSIS — R06.02 SHORTNESS OF BREATH: ICD-10-CM

## 2025-07-23 DIAGNOSIS — R94.39 ABNORMAL STRESS ECG WITH TREADMILL: ICD-10-CM

## 2025-07-23 DIAGNOSIS — R93.1 ABNORMAL CT SCAN OF HEART: ICD-10-CM

## 2025-07-23 DIAGNOSIS — I10 PRIMARY HYPERTENSION: Primary | ICD-10-CM

## 2025-07-23 PROCEDURE — 93000 ELECTROCARDIOGRAM COMPLETE: CPT | Performed by: NURSE PRACTITIONER

## 2025-07-23 PROCEDURE — 1159F MED LIST DOCD IN RCRD: CPT | Performed by: NURSE PRACTITIONER

## 2025-07-23 PROCEDURE — 3075F SYST BP GE 130 - 139MM HG: CPT | Performed by: NURSE PRACTITIONER

## 2025-07-23 PROCEDURE — 99214 OFFICE O/P EST MOD 30 MIN: CPT | Performed by: NURSE PRACTITIONER

## 2025-07-23 PROCEDURE — 3079F DIAST BP 80-89 MM HG: CPT | Performed by: NURSE PRACTITIONER

## 2025-07-23 PROCEDURE — 1160F RVW MEDS BY RX/DR IN RCRD: CPT | Performed by: NURSE PRACTITIONER

## 2025-07-23 RX ORDER — ISOSORBIDE MONONITRATE 30 MG/1
30 TABLET, EXTENDED RELEASE ORAL DAILY
Qty: 30 TABLET | Refills: 6 | Status: SHIPPED | OUTPATIENT
Start: 2025-07-23

## 2025-07-23 RX ORDER — ASPIRIN 81 MG/1
81 TABLET ORAL DAILY
Qty: 90 TABLET | Refills: 3 | Status: SHIPPED | OUTPATIENT
Start: 2025-07-23

## 2025-07-23 NOTE — PROGRESS NOTES
Subjective     Dagoberto Henley is a 25 y.o. male who presents to day for Chest Pain (That comes and goes is sharp ) and Shortness of Breath (Stays SOB).    CHIEF COMPLIANT  Chief Complaint   Patient presents with    Chest Pain     That comes and goes is sharp     Shortness of Breath     Stays SOB       Active Problems:  Problem List Items Addressed This Visit          Cardiac and Vasculature    Palpitations    Relevant Medications    isosorbide mononitrate (IMDUR) 30 MG 24 hr tablet    aspirin 81 MG EC tablet    Primary hypertension - Primary    Relevant Medications    isosorbide mononitrate (IMDUR) 30 MG 24 hr tablet    aspirin 81 MG EC tablet       Pulmonary and Pneumonias    Shortness of breath    Relevant Medications    isosorbide mononitrate (IMDUR) 30 MG 24 hr tablet    aspirin 81 MG EC tablet     Other Visit Diagnoses         Precordial pain        Relevant Medications    isosorbide mononitrate (IMDUR) 30 MG 24 hr tablet    aspirin 81 MG EC tablet      Abnormal stress ECG with treadmill        Relevant Medications    isosorbide mononitrate (IMDUR) 30 MG 24 hr tablet    aspirin 81 MG EC tablet      Abnormal CT scan of heart        Relevant Medications    isosorbide mononitrate (IMDUR) 30 MG 24 hr tablet    aspirin 81 MG EC tablet        1.  Chest pain  1.1 Treadmill stress 2/18/25: At peak exercise there is 1 mm for subtle to downsloping ST segment depression in lead III with a lesser degree of depression in the other inferior leads as well as lateral precordial leads. ST segment depression was no longer significant at 1 minute into recovery. There was a single PVC during exercise. There were moderately frequent PVCs in recovery including intercalated beats. There was no sustained ectopy or block recorded.   1.2 coronary CTA 3/29/2025: No evidence PTE, no aortic dissection, high-grade stenosis in LAD appears to be artifactual, total calcium score 0, discontinuity of RCA appears to be artifactual  2.  Shortness of  breath  2.1 Echocardiogram 2/18/25: ejection fraction is 55 to 60%. Normal LV diastolic function and filling pressures. Valves are morphologically and physiologically normal.   3.  Palpitations  4.  Hypertension    HPI  HPI  Mr. Dagoberto Henley is a 25-year-old male patient who is being followed up today for chronic arterial hypertension.     Patient does have a history of chronic arterial hypertension in which she is being treated with losartan, metoprolol and as needed clonidine.  Today's blood pressure is 136/84 heart rate of 51.  He says he only really checks his blood pressure if he feels weird.    Patient does report persistent chest pain which has been ongoing.  He has been under a variety of testing including treadmill stress test followed by CTA of the heart.  The CTA of the heart identified No evidence PE, no aortic dissection, high-grade stenosis in LAD appears to be artifactual, total calcium score 0, discontinuity of RCA appears to be artifactual.  We did review this again in detail.    I did have a discussion with patient's PCP Jaylin Mcmahon in regards to testing and patient's status.  She is concerned about patient's ongoing symptoms.    Patient does have shortness of breath which he reports that he stays short of breath.  He was seen by pulmonology and placed on oxygen.  He says he is short of breath whether he is active at rest and also has orthopnea.    The patient does report chest pain that occurs every day intermittently.  He says it occurs both with exercise and exertion.  He says that he will last for about 5 seconds.  Says that sharp in nature and does cause associated shortness of breath.    Patient does have palpitations intermittent racing skipping like sensation that occurs in his chest on a daily basis.  Usually short-lived.  He says it feels like his chest is pounding quite often.    Patient does have dizziness that occurs at random.  He says he has a lot of episodes of dizziness and  nausea most days.    Patient does report extensive fatiguetired all the time.  Says he just does not have any energy.  PRIOR MEDS  Current Outpatient Medications on File Prior to Visit   Medication Sig Dispense Refill    losartan (Cozaar) 25 MG tablet Take 0.5 tablets by mouth Daily. 15 tablet 6    polyethylene glycol (MIRALAX) 17 GM/SCOOP powder Take 255 g Miralax mixed with 32 oz clear liquid at 12pm night before procedure then repeat 6 hours later. (Patient taking differently: PRN) 510 g 0    vitamin B-12 (CYANOCOBALAMIN) 500 MCG tablet Take 1 tablet by mouth Daily. (Patient taking differently: Take 1 tablet by mouth Daily. Not started taking yet)      vitamin D (ERGOCALCIFEROL) 1.25 MG (88435 UT) capsule capsule TAKE 1 CAPSULE BY MOUTH ONCE A WEEK ON THE SAME DAY EVERY WEEK (Patient not taking: Reported on 7/23/2025)       No current facility-administered medications on file prior to visit.       ALLERGIES  Amoxicillin, Benadryl [diphenhydramine], Penicillins, and Nitrofuran derivatives    HISTORY  Past Medical History:   Diagnosis Date    Anxiety and depression     Hypertension     Stomach disease        Social History     Socioeconomic History    Marital status: Single   Tobacco Use    Smoking status: Never     Passive exposure: Past    Smokeless tobacco: Never   Vaping Use    Vaping status: Never Used   Substance and Sexual Activity    Alcohol use: Never    Drug use: Never    Sexual activity: Defer       Family History   Problem Relation Age of Onset    Kidney failure Mother     COPD Mother     Heart failure Mother     No Known Problems Other        Review of Systems   Constitutional:  Positive for fatigue.   Respiratory:  Positive for shortness of breath (has this all the time). Negative for apnea and chest tightness.    Cardiovascular:  Positive for chest pain (sharp pain middle of left uppre chest moves across to center of chest) and palpitations (racing and skips). Negative for leg swelling.  "  Gastrointestinal:  Positive for constipation. Negative for diarrhea and nausea.   Neurological:  Positive for dizziness (random) and light-headedness. Negative for syncope and weakness.   Hematological:  Bruises/bleeds easily (bruise).   Psychiatric/Behavioral:  Negative for sleep disturbance.        Objective     VITALS: /84 (BP Location: Left arm, Patient Position: Sitting, Cuff Size: Adult)   Pulse 51   Ht 165.1 cm (65\")   Wt 51 kg (112 lb 6.4 oz)   SpO2 100%   BMI 18.70 kg/m²     LABS:   Lab Results (most recent)       None            IMAGING:   CT Angiogram Coronary  Result Date: 3/30/2025  1.  No evidence of a pulmonary embolus. 2.  No aortic dissection. 3.  High-grade stenosis in the LAD also appears to be artifactual. 4.  Total calcium score 0. 5.  Tricuspid aortic valve without calcification. 6.  There is discontinuity of the RCA appears to be artifactual.  ASSESSMENT: CAD RADS N/P0  This report was finalized on 3/30/2025 8:47 AM by Dr. Domingo Templeton MD.        EXAM:  Physical Exam  Vitals and nursing note reviewed.   Constitutional:       Appearance: He is well-developed.   HENT:      Head: Normocephalic.   Neck:      Thyroid: No thyroid mass.      Vascular: No carotid bruit or JVD.      Trachea: Trachea and phonation normal.   Cardiovascular:      Rate and Rhythm: Normal rate and regular rhythm.      Pulses:           Radial pulses are 2+ on the right side and 2+ on the left side.        Posterior tibial pulses are 2+ on the right side and 2+ on the left side.      Heart sounds: Normal heart sounds. No murmur heard.     No friction rub. No gallop.   Pulmonary:      Effort: Pulmonary effort is normal. No respiratory distress.      Breath sounds: Normal breath sounds. No wheezing or rales.   Musculoskeletal:         General: No swelling. Normal range of motion.      Cervical back: Neck supple.   Skin:     General: Skin is warm and dry.      Capillary Refill: Capillary refill takes less than 2 " seconds.      Findings: No rash.   Neurological:      Mental Status: He is alert and oriented to person, place, and time.   Psychiatric:         Speech: Speech normal.         Behavior: Behavior normal.         Thought Content: Thought content normal.         Judgment: Judgment normal.         Procedure     ECG 12 Lead    Date/Time: 7/23/2025 4:11 PM  Performed by: Kingston James APRN    Authorized by: Kingston James APRN  Comparison: compared with previous ECG from 7/12/2025  Rhythm: sinus bradycardia  Rate: bradycardic  BPM: 51  QRS axis: right  Other findings: non-specific ST-T wave changes and left ventricular hypertrophy  Comments: QTc 377 ms  No acute changes             Assessment & Plan    Diagnosis Plan   1. Primary hypertension  isosorbide mononitrate (IMDUR) 30 MG 24 hr tablet    aspirin 81 MG EC tablet      2. Precordial pain  isosorbide mononitrate (IMDUR) 30 MG 24 hr tablet    aspirin 81 MG EC tablet      3. Shortness of breath  isosorbide mononitrate (IMDUR) 30 MG 24 hr tablet    aspirin 81 MG EC tablet      4. Palpitations  isosorbide mononitrate (IMDUR) 30 MG 24 hr tablet    aspirin 81 MG EC tablet      5. Abnormal stress ECG with treadmill  isosorbide mononitrate (IMDUR) 30 MG 24 hr tablet    aspirin 81 MG EC tablet      6. Abnormal CT scan of heart  isosorbide mononitrate (IMDUR) 30 MG 24 hr tablet    aspirin 81 MG EC tablet      1.  Patient's blood pressure is controlled on current blood pressure medication regimen.  No medication changes are warranted at this time.  Patient advised to monitor blood pressure on a daily basis and report any persistent highs or lows.  Set goal blood pressure for patient at 130/80 or below.  2.  Patient continues to have chest pain and significant shortness of breath on several times a day.  We did review his CTA and previous treadmill stress test.  We discussed further options for evaluation of ischemia including nuclear stress test versus left heart  catheterization versus medical management.  Each of these procedures were discussed in detail.  Patient wishes to move forth with medical management.  He has previously been on Ranexa.  I would like to start him on isosorbide 30 mg daily.  As well as aspirin 81 mg daily.  Advised if he decided to move forth with one of the other diagnostic testing just to notify me and we will move forth at that time.  3.  Informed of signs and symptoms of ACS and advised to seek emergent treatment for any new worsening symptoms.  Patient also advised sooner follow-up as needed.  Also advised to follow-up with family doctor as needed  This note is dictated utilizing voice recognition software.  Although this record has been proof read, transcriptional errors may still be present. If questions occur regarding the content of this record please do not hesitate to call our office.  I have reviewed and confirmed the accuracy of the ROS as documented by the MA/LPN/RN SENTHIL Kirby    Return if symptoms worsen or fail to improve, for Next scheduled follow up.    Diagnoses and all orders for this visit:    1. Primary hypertension (Primary)  -     isosorbide mononitrate (IMDUR) 30 MG 24 hr tablet; Take 1 tablet by mouth Daily.  Dispense: 30 tablet; Refill: 6  -     aspirin 81 MG EC tablet; Take 1 tablet by mouth Daily.  Dispense: 90 tablet; Refill: 3    2. Precordial pain  -     isosorbide mononitrate (IMDUR) 30 MG 24 hr tablet; Take 1 tablet by mouth Daily.  Dispense: 30 tablet; Refill: 6  -     aspirin 81 MG EC tablet; Take 1 tablet by mouth Daily.  Dispense: 90 tablet; Refill: 3    3. Shortness of breath  -     isosorbide mononitrate (IMDUR) 30 MG 24 hr tablet; Take 1 tablet by mouth Daily.  Dispense: 30 tablet; Refill: 6  -     aspirin 81 MG EC tablet; Take 1 tablet by mouth Daily.  Dispense: 90 tablet; Refill: 3    4. Palpitations  -     isosorbide mononitrate (IMDUR) 30 MG 24 hr tablet; Take 1 tablet by mouth Daily.  Dispense:  30 tablet; Refill: 6  -     aspirin 81 MG EC tablet; Take 1 tablet by mouth Daily.  Dispense: 90 tablet; Refill: 3    5. Abnormal stress ECG with treadmill  -     isosorbide mononitrate (IMDUR) 30 MG 24 hr tablet; Take 1 tablet by mouth Daily.  Dispense: 30 tablet; Refill: 6  -     aspirin 81 MG EC tablet; Take 1 tablet by mouth Daily.  Dispense: 90 tablet; Refill: 3    6. Abnormal CT scan of heart  -     isosorbide mononitrate (IMDUR) 30 MG 24 hr tablet; Take 1 tablet by mouth Daily.  Dispense: 30 tablet; Refill: 6  -     aspirin 81 MG EC tablet; Take 1 tablet by mouth Daily.  Dispense: 90 tablet; Refill: 3    Other orders  -     ECG 12 Lead        Dagoberto Henley  reports that he has never smoked. He has been exposed to tobacco smoke. He has never used smokeless tobacco. I have educated him on the risk of diseases from using tobacco products. Patient does not smoke.        BMI is within normal parameters. No other follow-up for BMI required.           MEDS ORDERED DURING VISIT:  New Medications Ordered This Visit   Medications    isosorbide mononitrate (IMDUR) 30 MG 24 hr tablet     Sig: Take 1 tablet by mouth Daily.     Dispense:  30 tablet     Refill:  6    aspirin 81 MG EC tablet     Sig: Take 1 tablet by mouth Daily.     Dispense:  90 tablet     Refill:  3           This document has been electronically signed by Kingston James Jr., APRN  July 23, 2025 16:28 EDT

## 2025-07-24 ENCOUNTER — TELEPHONE (OUTPATIENT)
Dept: CARDIOLOGY | Facility: CLINIC | Age: 25
End: 2025-07-24
Payer: COMMERCIAL

## 2025-07-24 NOTE — TELEPHONE ENCOUNTER
Caller: [facility]    Relationship: Other    Best call back number: [phone]    What is the best time to reach you: ANY    Who are you requesting to speak with (clinical staff, provider,  specific staff member): CLINICAL       What was the call regarding: THEY ARE THE PATIENT NEW PRIMARY CARE FIRST CHOICE [facility]MMED[facility]ATE CARE, NEEDS OFFICE NOTES FAXED PLEASE FAX [phone]

## 2025-08-28 ENCOUNTER — RESULTS FOLLOW-UP (OUTPATIENT)
Dept: CARDIOLOGY | Facility: CLINIC | Age: 25
End: 2025-08-28
Payer: COMMERCIAL